# Patient Record
Sex: FEMALE | Employment: FULL TIME | URBAN - METROPOLITAN AREA
[De-identification: names, ages, dates, MRNs, and addresses within clinical notes are randomized per-mention and may not be internally consistent; named-entity substitution may affect disease eponyms.]

---

## 2017-02-07 ENCOUNTER — OFFICE VISIT (OUTPATIENT)
Dept: OBGYN CLINIC | Facility: CLINIC | Age: 47
End: 2017-02-07

## 2017-02-07 VITALS
WEIGHT: 128 LBS | HEIGHT: 66 IN | DIASTOLIC BLOOD PRESSURE: 99 MMHG | SYSTOLIC BLOOD PRESSURE: 170 MMHG | BODY MASS INDEX: 20.57 KG/M2

## 2017-02-07 DIAGNOSIS — Z12.31 VISIT FOR SCREENING MAMMOGRAM: ICD-10-CM

## 2017-02-07 DIAGNOSIS — Z01.419 WELL WOMAN EXAM WITH ROUTINE GYNECOLOGICAL EXAM: Primary | ICD-10-CM

## 2017-02-07 PROCEDURE — 99396 PREV VISIT EST AGE 40-64: CPT | Performed by: OBSTETRICS & GYNECOLOGY

## 2017-02-07 NOTE — PROGRESS NOTES
HPI:    Patient ID: Rajni Stephens is a 51year old female. HPI  Well woman exam  Menses mostly regular with a couple of irregular months including January. Felt a pea sized lump rt breast which she no longer feels. No mass on exam today.   Normal xiomy Smoking Status: Never Smoker                      Alcohol Use: Yes           0.0 oz/week       0 Standard drinks or equivalent per week       Comment: 1 glass, occasionally       PHYSICAL EXAM:    Physical Exam   Constitutional: She appears well-developed gynecological exam  (primary encounter diagnosis)  Elevated bp. History of \"borderline\" htn. Not on meds. Irregular menses occas. Transient v small rt breast lump. Gone now.   Monthly sbe, screening mammo in April  Normal exam.  Pap/HPV co-test q 3 y

## 2017-04-20 ENCOUNTER — HOSPITAL ENCOUNTER (OUTPATIENT)
Dept: MAMMOGRAPHY | Facility: HOSPITAL | Age: 47
Discharge: HOME | End: 2017-04-20
Attending: OBSTETRICS & GYNECOLOGY
Payer: COMMERCIAL

## 2017-04-20 DIAGNOSIS — Z12.31 VISIT FOR SCREENING MAMMOGRAM: ICD-10-CM

## 2017-04-20 PROCEDURE — 77067 SCR MAMMO BI INCL CAD: CPT

## 2017-06-30 ENCOUNTER — HOSPITAL ENCOUNTER (OUTPATIENT)
Age: 47
Discharge: HOME | End: 2017-06-30
Attending: EMERGENCY MEDICINE
Payer: COMMERCIAL

## 2017-06-30 VITALS
BODY MASS INDEX: 20.89 KG/M2 | HEART RATE: 77 BPM | OXYGEN SATURATION: 99 % | HEIGHT: 66 IN | TEMPERATURE: 99 F | WEIGHT: 130 LBS | RESPIRATION RATE: 16 BRPM

## 2017-06-30 DIAGNOSIS — H61.22 IMPACTED CERUMEN OF LEFT EAR: Primary | ICD-10-CM

## 2017-06-30 PROCEDURE — 99202 OFFICE O/P NEW SF 15 MIN: CPT

## 2017-06-30 PROCEDURE — 99213 OFFICE O/P EST LOW 20 MIN: CPT

## 2017-06-30 RX ORDER — LISINOPRIL 20 MG/1
20 TABLET ORAL DAILY
COMMUNITY
End: 2018-04-23

## 2017-06-30 NOTE — ED PROVIDER NOTES
Patient Seen in: [facility] Immediate Care In [address] [place] [address]    History   Patient presents with:  Ear Problem Pain (neurosensory)    Stated Complaint: ear problem    HPI    11-year-old female with history of hypertension presents with complaints of clog Yes           0.0 oz/week     Comment: 1 glass, occasionally      Review of Systems    Positive for stated complaint: ear problem  Other systems are as noted in HPI. Constitutional and vital signs reviewed.       All other systems reviewed and negative exc taking these medications    carbamide peroxide (DEBROX) 6.5 % Otic Solution  Place 5 drops into the left ear 2 (two) times daily.   Qty: 1 Bottle Refills: 0

## 2017-09-15 ENCOUNTER — OFFICE VISIT (OUTPATIENT)
Dept: OBGYN CLINIC | Facility: CLINIC | Age: 47
End: 2017-09-15

## 2017-09-15 VITALS
DIASTOLIC BLOOD PRESSURE: 86 MMHG | BODY MASS INDEX: 20 KG/M2 | WEIGHT: 124.63 LBS | SYSTOLIC BLOOD PRESSURE: 146 MMHG | HEART RATE: 82 BPM

## 2017-09-15 DIAGNOSIS — N63.10 LUMP OF RIGHT BREAST: Primary | ICD-10-CM

## 2017-09-15 PROCEDURE — 99213 OFFICE O/P EST LOW 20 MIN: CPT | Performed by: OBSTETRICS & GYNECOLOGY

## 2017-09-15 NOTE — PROGRESS NOTES
HPI:    Patient ID: Iona Lin is a 49year old female. HPI  Gyne problem. Complains of noticing a breast lump in her right breast this morning. Denies pain or tenderness or trauma. Had a normal mammogram in April. Category B breast density.   Angela Henriquezs

## 2017-09-19 ENCOUNTER — TELEPHONE (OUTPATIENT)
Dept: PEDIATRICS CLINIC | Facility: CLINIC | Age: 47
End: 2017-09-19

## 2017-09-19 ENCOUNTER — NURSE NAVIGATOR ENCOUNTER (OUTPATIENT)
Dept: HEMATOLOGY/ONCOLOGY | Facility: HOSPITAL | Age: 47
End: 2017-09-19

## 2017-09-19 ENCOUNTER — HOSPITAL ENCOUNTER (OUTPATIENT)
Dept: ULTRASOUND IMAGING | Facility: HOSPITAL | Age: 47
Discharge: HOME | End: 2017-09-19
Attending: OBSTETRICS & GYNECOLOGY
Payer: COMMERCIAL

## 2017-09-19 ENCOUNTER — HOSPITAL ENCOUNTER (OUTPATIENT)
Dept: MAMMOGRAPHY | Facility: HOSPITAL | Age: 47
Discharge: HOME | End: 2017-09-19
Attending: OBSTETRICS & GYNECOLOGY
Payer: COMMERCIAL

## 2017-09-19 ENCOUNTER — TELEPHONE (OUTPATIENT)
Dept: OBGYN CLINIC | Facility: CLINIC | Age: 47
End: 2017-09-19

## 2017-09-19 DIAGNOSIS — N63.10 LUMP OF RIGHT BREAST: ICD-10-CM

## 2017-09-19 PROCEDURE — 76642 ULTRASOUND BREAST LIMITED: CPT | Performed by: OBSTETRICS & GYNECOLOGY

## 2017-09-19 PROCEDURE — 77066 DX MAMMO INCL CAD BI: CPT | Performed by: OBSTETRICS & GYNECOLOGY

## 2017-09-19 NOTE — PROGRESS NOTES
Patient presents today for Diagnostic imaging of her right breast.  Breast RN Navigator to meet with patient and her spouse Gregor Lanes to discuss further recommendations from Dr. Juan to proceed with a right breast US guided biopsy.     Patient reports finding Educated the patient they will be awake during this procedure and are able to drive themselves home if they wish. Educated patient that some soreness may occur after biopsy.   Discussed use of a supportive bra and ice packs after procedure, to decrease

## 2017-09-19 NOTE — TELEPHONE ENCOUNTER
Called patient to review results of mammogram and ultrasound. Needs ultrasound-guided right breast biopsy for evaluation of mass. Orders to be signed off and sent.

## 2017-09-20 ENCOUNTER — NURSE NAVIGATOR ENCOUNTER (OUTPATIENT)
Dept: HEMATOLOGY/ONCOLOGY | Facility: HOSPITAL | Age: 47
End: 2017-09-20

## 2017-09-20 NOTE — PROGRESS NOTES
Navigator received phone call from patient inquiring about biopsy scheduling. Navigator called back, discussed ordering/scheduling process. Pt aware she will be receiving a call from Radiology scheduling once biopsy order has been obtained.

## 2017-09-21 ENCOUNTER — TELEPHONE (OUTPATIENT)
Dept: OBGYN CLINIC | Facility: CLINIC | Age: 47
End: 2017-09-21

## 2017-09-21 NOTE — PROGRESS NOTES
Navigator received voicemail from patient requesting assistance scheduling her breast biopsy. Navigator called patient back, confirmed medications, and transferred patient to Radiology scheduling to make appointment.

## 2017-09-22 ENCOUNTER — HOSPITAL ENCOUNTER (OUTPATIENT)
Dept: ULTRASOUND IMAGING | Facility: HOSPITAL | Age: 47
Discharge: HOME | End: 2017-09-22
Attending: OBSTETRICS & GYNECOLOGY
Payer: COMMERCIAL

## 2017-09-22 ENCOUNTER — HOSPITAL ENCOUNTER (OUTPATIENT)
Dept: MAMMOGRAPHY | Facility: HOSPITAL | Age: 47
Discharge: HOME | End: 2017-09-22
Attending: OBSTETRICS & GYNECOLOGY
Payer: COMMERCIAL

## 2017-09-22 VITALS — HEART RATE: 86 BPM | SYSTOLIC BLOOD PRESSURE: 135 MMHG | DIASTOLIC BLOOD PRESSURE: 90 MMHG | RESPIRATION RATE: 16 BRPM

## 2017-09-22 DIAGNOSIS — N63.0 BREAST MASS: ICD-10-CM

## 2017-09-22 DIAGNOSIS — Z17.1 MALIGNANT NEOPLASM OF LOWER-OUTER QUADRANT OF RIGHT BREAST OF FEMALE, ESTROGEN RECEPTOR NEGATIVE (HCC): Primary | ICD-10-CM

## 2017-09-22 DIAGNOSIS — C50.511 MALIGNANT NEOPLASM OF LOWER-OUTER QUADRANT OF RIGHT BREAST OF FEMALE, ESTROGEN RECEPTOR NEGATIVE (HCC): Primary | ICD-10-CM

## 2017-09-22 PROCEDURE — 88342 IMHCHEM/IMCYTCHM 1ST ANTB: CPT | Performed by: OBSTETRICS & GYNECOLOGY

## 2017-09-22 PROCEDURE — 88305 TISSUE EXAM BY PATHOLOGIST: CPT | Performed by: OBSTETRICS & GYNECOLOGY

## 2017-09-22 PROCEDURE — 10022 US BREAST BIOPSY WITH CLIP 1 SITE RIGHT: CPT | Performed by: OBSTETRICS & GYNECOLOGY

## 2017-09-22 PROCEDURE — 88172 CYTP DX EVAL FNA 1ST EA SITE: CPT | Performed by: OBSTETRICS & GYNECOLOGY

## 2017-09-22 PROCEDURE — 88360 TUMOR IMMUNOHISTOCHEM/MANUAL: CPT | Performed by: OBSTETRICS & GYNECOLOGY

## 2017-09-22 PROCEDURE — 88173 CYTOPATH EVAL FNA REPORT: CPT | Performed by: OBSTETRICS & GYNECOLOGY

## 2017-09-22 PROCEDURE — 77065 DX MAMMO INCL CAD UNI: CPT | Performed by: OBSTETRICS & GYNECOLOGY

## 2017-09-22 PROCEDURE — 88177 CYTP FNA EVAL EA ADDL: CPT | Performed by: OBSTETRICS & GYNECOLOGY

## 2017-09-22 PROCEDURE — 19083 BX BREAST 1ST LESION US IMAG: CPT | Performed by: OBSTETRICS & GYNECOLOGY

## 2017-09-22 RX ORDER — LIDOCAINE HYDROCHLORIDE 10 MG/ML
INJECTION, SOLUTION EPIDURAL; INFILTRATION; INTRACAUDAL; PERINEURAL
Status: DISPENSED
Start: 2017-09-22 | End: 2017-09-22

## 2017-09-22 NOTE — PROCEDURES
[facility]  Procedure Note    Alex Rodriguez Patient Status:  Outpatient    1970 MRN [id number]   Location [address] [facility] Attending Sarah Khan MD   Hosp Day # 0 PCP Diego Hawkins MD     Procedure: ultra

## 2017-09-22 NOTE — IMAGING NOTE
PT ARRIVED TO ROOM 3   SCANS BY [address]    HX TAKEN PROCEDURE EXPLAINED QUESTIONS ANSWERED  Right breast lump, new palpable mass    PT CONSENTED  [facility]  DR SHARMA [facility]    TIMEOUT TAKEN AT [address]

## 2017-09-25 ENCOUNTER — NURSE NAVIGATOR ENCOUNTER (OUTPATIENT)
Dept: HEMATOLOGY/ONCOLOGY | Facility: HOSPITAL | Age: 47
End: 2017-09-25

## 2017-09-25 ENCOUNTER — OFFICE VISIT (OUTPATIENT)
Dept: OBGYN CLINIC | Facility: CLINIC | Age: 47
End: 2017-09-25

## 2017-09-25 ENCOUNTER — TELEPHONE (OUTPATIENT)
Dept: PEDIATRICS CLINIC | Facility: CLINIC | Age: 47
End: 2017-09-25

## 2017-09-25 DIAGNOSIS — C50.411 MALIGNANT NEOPLASM OF UPPER-OUTER QUADRANT OF RIGHT FEMALE BREAST, UNSPECIFIED ESTROGEN RECEPTOR STATUS (HCC): Primary | ICD-10-CM

## 2017-09-25 PROBLEM — C50.511 BREAST CANCER OF LOWER-OUTER QUADRANT OF RIGHT FEMALE BREAST (HCC): Status: ACTIVE | Noted: 2017-09-25

## 2017-09-25 PROCEDURE — 99213 OFFICE O/P EST LOW 20 MIN: CPT | Performed by: OBSTETRICS & GYNECOLOGY

## 2017-09-25 NOTE — PROGRESS NOTES
Phoned patient and reintroduced myself as Breast RN Navigator. Patient has met with Dr. Suzi Ghotra this morning to discuss results from her right breast biopsy. Emotional support provided to patient.   Dr. Suzi Ghotra provided patient with appointment information to

## 2017-09-25 NOTE — PROGRESS NOTES
HPI:    Patient ID: Sarah Interiano is a 49year old female. HPI  Consultation  Asked patient to come in to discuss results of her breast bx. Here with her  Grecia Foley. Informed of bx positive for infiltrating ductal CA, gr 3.   Focal lymphovascular

## 2017-09-26 ENCOUNTER — HOSPITAL ENCOUNTER (OUTPATIENT)
Dept: CT IMAGING | Facility: HOSPITAL | Age: 47
Discharge: HOME | End: 2017-09-26
Attending: INTERNAL MEDICINE
Payer: COMMERCIAL

## 2017-09-26 ENCOUNTER — HOSPITAL ENCOUNTER (OUTPATIENT)
Dept: GENERAL RADIOLOGY | Facility: HOSPITAL | Age: 47
Discharge: HOME | End: 2017-09-26
Attending: INTERNAL MEDICINE
Payer: COMMERCIAL

## 2017-09-26 ENCOUNTER — OFFICE VISIT (OUTPATIENT)
Dept: HEMATOLOGY/ONCOLOGY | Facility: HOSPITAL | Age: 47
End: 2017-09-26
Attending: INTERNAL MEDICINE
Payer: COMMERCIAL

## 2017-09-26 ENCOUNTER — LAB ENCOUNTER (OUTPATIENT)
Dept: LAB | Facility: HOSPITAL | Age: 47
End: 2017-09-26
Attending: INTERNAL MEDICINE
Payer: COMMERCIAL

## 2017-09-26 ENCOUNTER — NURSE NAVIGATOR ENCOUNTER (OUTPATIENT)
Dept: HEMATOLOGY/ONCOLOGY | Facility: HOSPITAL | Age: 47
End: 2017-09-26

## 2017-09-26 ENCOUNTER — TELEPHONE (OUTPATIENT)
Dept: PEDIATRICS CLINIC | Facility: CLINIC | Age: 47
End: 2017-09-26

## 2017-09-26 ENCOUNTER — TELEPHONE (OUTPATIENT)
Dept: HEMATOLOGY/ONCOLOGY | Facility: HOSPITAL | Age: 47
End: 2017-09-26

## 2017-09-26 VITALS
DIASTOLIC BLOOD PRESSURE: 77 MMHG | TEMPERATURE: 99 F | HEIGHT: 66 IN | RESPIRATION RATE: 16 BRPM | SYSTOLIC BLOOD PRESSURE: 159 MMHG | WEIGHT: 122.38 LBS | HEART RATE: 84 BPM | BODY MASS INDEX: 19.67 KG/M2

## 2017-09-26 DIAGNOSIS — C50.511 BREAST CANCER OF LOWER-OUTER QUADRANT OF RIGHT FEMALE BREAST (HCC): Primary | ICD-10-CM

## 2017-09-26 DIAGNOSIS — R05.9 COUGHING: ICD-10-CM

## 2017-09-26 DIAGNOSIS — R05.9 COUGH IN ADULT: ICD-10-CM

## 2017-09-26 DIAGNOSIS — C50.912 NEOPLASM OF LEFT BREAST, REGIONAL LYMPH NODE STAGING CATEGORY PN0 PER AMERICAN JOINT COMMITTEE ON CANCER STAGING GUIDELINES, 7TH EDITION (HCC): Primary | ICD-10-CM

## 2017-09-26 DIAGNOSIS — Z79.899 ENCOUNTER FOR MONITORING CARDIOTOXIC DRUG THERAPY: ICD-10-CM

## 2017-09-26 DIAGNOSIS — C50.511 BREAST CANCER OF LOWER-OUTER QUADRANT OF RIGHT FEMALE BREAST (HCC): ICD-10-CM

## 2017-09-26 DIAGNOSIS — C50.511 MALIGNANT NEOPLASM OF LOWER-OUTER QUADRANT OF RIGHT BREAST OF FEMALE, ESTROGEN RECEPTOR NEGATIVE (HCC): ICD-10-CM

## 2017-09-26 DIAGNOSIS — Z17.1 MALIGNANT NEOPLASM OF LOWER-OUTER QUADRANT OF RIGHT BREAST OF FEMALE, ESTROGEN RECEPTOR NEGATIVE (HCC): ICD-10-CM

## 2017-09-26 DIAGNOSIS — I10 ESSENTIAL HYPERTENSION: ICD-10-CM

## 2017-09-26 DIAGNOSIS — R92.2 DENSE BREASTS: ICD-10-CM

## 2017-09-26 DIAGNOSIS — Z51.81 ENCOUNTER FOR MONITORING CARDIOTOXIC DRUG THERAPY: ICD-10-CM

## 2017-09-26 LAB
ALBUMIN SERPL BCP-MCNC: 4.3 G/DL (ref 3.5–4.8)
ALBUMIN/GLOB SERPL: 1.7 {RATIO} (ref 1–2)
ALP SERPL-CCNC: 51 U/L (ref 32–100)
ALT SERPL-CCNC: 15 U/L (ref 14–54)
ANION GAP SERPL CALC-SCNC: 6 MMOL/L (ref 0–18)
AST SERPL-CCNC: 23 U/L (ref 15–41)
BASOPHILS # BLD: 0 K/UL (ref 0–0.2)
BASOPHILS NFR BLD: 1 %
BILIRUB SERPL-MCNC: 0.7 MG/DL (ref 0.3–1.2)
BUN SERPL-MCNC: 7 MG/DL (ref 8–20)
BUN/CREAT SERPL: 11.9 (ref 10–20)
CALCIUM SERPL-MCNC: 8.9 MG/DL (ref 8.5–10.5)
CHLORIDE SERPL-SCNC: 103 MMOL/L (ref 95–110)
CO2 SERPL-SCNC: 29 MMOL/L (ref 22–32)
CREAT BLD-MCNC: 0.6 MG/DL (ref 0.5–1.5)
CREAT SERPL-MCNC: 0.59 MG/DL (ref 0.5–1.5)
EOSINOPHIL # BLD: 0 K/UL (ref 0–0.7)
EOSINOPHIL NFR BLD: 1 %
ERYTHROCYTE [DISTWIDTH] IN BLOOD BY AUTOMATED COUNT: 13 % (ref 11–15)
GLOBULIN PLAS-MCNC: 2.5 G/DL (ref 2.5–3.7)
GLUCOSE SERPL-MCNC: 83 MG/DL (ref 70–99)
HCT VFR BLD AUTO: 41.4 % (ref 35–48)
HGB BLD-MCNC: 14.1 G/DL (ref 12–16)
LYMPHOCYTES # BLD: 1.5 K/UL (ref 1–4)
LYMPHOCYTES NFR BLD: 19 %
MCH RBC QN AUTO: 30.9 PG (ref 27–32)
MCHC RBC AUTO-ENTMCNC: 34 G/DL (ref 32–37)
MCV RBC AUTO: 90.9 FL (ref 80–100)
MONOCYTES # BLD: 0.4 K/UL (ref 0–1)
MONOCYTES NFR BLD: 5 %
NEUTROPHILS # BLD AUTO: 6.1 K/UL (ref 1.8–7.7)
NEUTROPHILS NFR BLD: 75 %
OSMOLALITY UR CALC.SUM OF ELEC: 283 MOSM/KG (ref 275–295)
PLATELET # BLD AUTO: 262 K/UL (ref 140–400)
PMV BLD AUTO: 10 FL (ref 7.4–10.3)
POTASSIUM SERPL-SCNC: 3.5 MMOL/L (ref 3.3–5.1)
PROT SERPL-MCNC: 6.8 G/DL (ref 5.9–8.4)
RBC # BLD AUTO: 4.55 M/UL (ref 3.7–5.4)
SODIUM SERPL-SCNC: 138 MMOL/L (ref 136–144)
WBC # BLD AUTO: 8.2 K/UL (ref 4–11)

## 2017-09-26 PROCEDURE — 93010 ELECTROCARDIOGRAM REPORT: CPT | Performed by: INTERNAL MEDICINE

## 2017-09-26 PROCEDURE — 36415 COLL VENOUS BLD VENIPUNCTURE: CPT

## 2017-09-26 PROCEDURE — 71260 CT THORAX DX C+: CPT | Performed by: INTERNAL MEDICINE

## 2017-09-26 PROCEDURE — 93005 ELECTROCARDIOGRAM TRACING: CPT

## 2017-09-26 PROCEDURE — 85025 COMPLETE CBC W/AUTO DIFF WBC: CPT

## 2017-09-26 PROCEDURE — 99205 OFFICE O/P NEW HI 60 MIN: CPT | Performed by: INTERNAL MEDICINE

## 2017-09-26 PROCEDURE — 80053 COMPREHEN METABOLIC PANEL: CPT

## 2017-09-26 PROCEDURE — 82565 ASSAY OF CREATININE: CPT

## 2017-09-26 PROCEDURE — 74177 CT ABD & PELVIS W/CONTRAST: CPT | Performed by: INTERNAL MEDICINE

## 2017-09-26 PROCEDURE — 71020 XR CHEST PA + LAT CHEST (CPT=71020): CPT | Performed by: INTERNAL MEDICINE

## 2017-09-26 PROCEDURE — 99212 OFFICE O/P EST SF 10 MIN: CPT | Performed by: INTERNAL MEDICINE

## 2017-09-26 NOTE — TELEPHONE ENCOUNTER
Pt states she needs dr notes regarding breast cancer faxed to [facility] today 9/26    [facility]#[id number] attn:[email or web address]

## 2017-09-26 NOTE — TELEPHONE ENCOUNTER
Patient phoned with questions prior to her visit with Dr. Karolina Blake today. Patient shares that her spouse had spoken with a family friend, who has encouraged patient to inquire as to running additional tests on her biopsy tissue.   In particular patient wishe

## 2017-09-26 NOTE — PROGRESS NOTES
Pt scored 8 on distress screening- worries about treatment, , transportation, worry etc. I gave her the support system sheet and introduced our social wokers role, she just got diagnosis and is probably  seeking a second opinion elsewhere.  Pt sta

## 2017-09-27 ENCOUNTER — TELEPHONE (OUTPATIENT)
Dept: HEMATOLOGY/ONCOLOGY | Facility: HOSPITAL | Age: 47
End: 2017-09-27

## 2017-09-27 ENCOUNTER — OFFICE VISIT (OUTPATIENT)
Dept: SURGERY | Facility: CLINIC | Age: 47
End: 2017-09-27

## 2017-09-27 ENCOUNTER — GENETICS ENCOUNTER (OUTPATIENT)
Dept: GENETICS | Facility: HOSPITAL | Age: 47
End: 2017-09-27

## 2017-09-27 ENCOUNTER — APPOINTMENT (OUTPATIENT)
Dept: GENETICS | Facility: HOSPITAL | Age: 47
End: 2017-09-27
Attending: GENETIC COUNSELOR, MS
Payer: COMMERCIAL

## 2017-09-27 VITALS
RESPIRATION RATE: 18 BRPM | TEMPERATURE: 98 F | SYSTOLIC BLOOD PRESSURE: 140 MMHG | DIASTOLIC BLOOD PRESSURE: 89 MMHG | OXYGEN SATURATION: 100 % | HEART RATE: 69 BPM

## 2017-09-27 DIAGNOSIS — Z17.1 MALIGNANT NEOPLASM OF LOWER-OUTER QUADRANT OF RIGHT BREAST OF FEMALE, ESTROGEN RECEPTOR NEGATIVE (HCC): Primary | ICD-10-CM

## 2017-09-27 DIAGNOSIS — C50.511 MALIGNANT NEOPLASM OF LOWER-OUTER QUADRANT OF RIGHT BREAST OF FEMALE, ESTROGEN RECEPTOR NEGATIVE (HCC): Primary | ICD-10-CM

## 2017-09-27 DIAGNOSIS — F41.9 ANXIETY: ICD-10-CM

## 2017-09-27 PROBLEM — R92.30 DENSE BREASTS: Status: ACTIVE | Noted: 2017-09-27

## 2017-09-27 PROBLEM — R92.2 DENSE BREASTS: Status: ACTIVE | Noted: 2017-09-27

## 2017-09-27 PROBLEM — R05.9 COUGH IN ADULT: Status: ACTIVE | Noted: 2017-09-27

## 2017-09-27 PROBLEM — C50.912 NEOPLASM OF LEFT BREAST, REGIONAL LYMPH NODE STAGING CATEGORY PN0 PER AMERICAN JOINT COMMITTEE ON CANCER STAGING GUIDELINES, 7TH EDITION (HCC): Status: ACTIVE | Noted: 2017-09-27

## 2017-09-27 PROBLEM — I10 HYPERTENSION: Status: ACTIVE | Noted: 2017-09-27

## 2017-09-27 PROCEDURE — 96040 MEDICAL GENETICS COUNSELING EA 30 MIN: CPT | Performed by: GENETIC COUNSELOR, MS

## 2017-09-27 PROCEDURE — 99245 OFF/OP CONSLTJ NEW/EST HI 55: CPT | Performed by: SURGERY

## 2017-09-27 PROCEDURE — 36415 COLL VENOUS BLD VENIPUNCTURE: CPT

## 2017-09-27 RX ORDER — DIAZEPAM 5 MG/1
5 TABLET ORAL 3 TIMES DAILY PRN
Qty: 10 TABLET | Refills: 0 | Status: SHIPPED | OUTPATIENT
Start: 2017-09-27 | End: 2018-06-28

## 2017-09-27 NOTE — PROGRESS NOTES
NISHA Chatman is a 49year old female who self palpated a mass in the outer lower quadrant of the left breast, saw Dr. Philip Alegria, had a diagnostic mammogram 9/19/17, and a core biopsy of the left breast 9/22/17 with FNA of left axillary node.  Charles 2002-Pregnancy blood clot in placenta bed rest [id number], ; -Pregnancy bed rest [id number]  labor symptoms,  section TWINS; - section with TUBAL   • Hypertension    • Myopia with astigmatism     OU   • Ophthalmological Pulmonary/Chest: Effort normal and breath sounds normal. No respiratory distress. She has no wheezes. She has no rales. Breasts are asymmetrical.       Abdominal: Bowel sounds are normal. She exhibits no distension. There is no tenderness.    Dany Schirmer Bilirubin, Total 0.7 0.3 - 1.2 mg/dL   Total Protein 6.8 5.9 - 8.4 g/dL   Albumin 4.3 3.5 - 4.8 g/dL   Globulin 2.5 2.5 - 3.7 g/dL   A/G Ratio 1.7 1.0 - 2.0   Anion Gap 6 0 - 18 mmol/L   BUN/CREA Ratio 11.9 10.0 - 20.0   Calculated Osmolality 283 834 - 295 The concurrent fine needle aspiration biopsy of the right breast lesion ([id number]), is also reviewed, the cytologic and histologic findings correlate.   The concurrent fine-needle aspiration biopsy of the right axillary lymph node ([id number]), and the ce The tissue that has been submitted for tumor markers by manual quantitative and semi quantitative analysis was fixed in 10% neutral buffered formalin, following the recommended CAP guidelines time fixation (6-72 hours ).      All antibodies are validated to Component Discrete Value Range Status Case Report Non-Gynecologic Cytology                          Case: [id number]                                 Authorizing Provider:  Alban Liu MD       Collected:           09/22/2017 11:27 AM         Ordering For  purposes, this case was reviewed by a second pathologist who concurred with the diagnosis. Dr. Bridgette Bermudez  has been notified with the above findings on 9- at 10.30 a.m.      PROCEDURE:  SHADE BIOPSY POST DIGITAL IMAGE RIGHT (CPT=7706 COMPARISON: [facility], [facility] PF DIGITAL SCREEN W CAD, 3/28/2015, 8:50. [facility] DIGITAL SCREEN W CAD PF, 4/13/2016, 11:31.   [place], [place] DIAGNOSTIC BILATERAL ILF=[id number]), department nurse, who also provided the patient with post biopsy aftercare instructions. Prior to discharge the patient did go to mammography. The clip was within the expected mammographic finding. She was discharged in satisfactory condition.  She was   in For  purposes, this case was reviewed by a second pathologist who concurred with the diagnosis. Dr. Rubén Montoya has been notified with the above findings on [id number] 10.30 a.m.      Tumor Markers by Immunostaining (ultraView, Biotin free, Uni DESCRIPTION: The patient was informed of the nature of the procedure which included a discussion of the benefits and risks including, but not limited to, hemorrhage and infection. Informed consent was obtained.   An ultrasound guided percutaneous biopsy MEDICATION:   1% lidocaine administered locally. COMPLICATIONS:         None. PATHOLOGY:   Pending.        Dictated by (CST): Karyn Bean MD on 9/22/2017 at 11:54       Approved by (CST): Karyn Bean MD on 9/22/2017 at 15:19          === microcalcifications. No additional mass, cyst or sonographically suspicious abnormality. Ultrasound evaluation of   the right axilla show a few mildly prominent but otherwise unremarkable/normal-appearing lymph nodes.      Dictated by (CST): Reyna España

## 2017-09-27 NOTE — PROGRESS NOTES
Patient presents to the [facility], accompanied by her spouse Ronny Garner, for consultation with Dr. Kiko Whittaker. Patient recently diagnosed with right breast infiltrating ductal carcinoma. Patient is know to me previously as Breast RN Navigator.   Becky Yousif be sent for Oncotype DX for genomic confirmation of ER/FL. Dr. Ede Lutz is also recommending genetic counseling for consideration of testing. Coordinated with [facility] staff in the [facility]. Insurance authorization obtained for CT scan.   Radiology cont

## 2017-09-28 NOTE — PROGRESS NOTES
Reason for visit: Ms. Franklin Rudolph is a 48-year-old woman referred to genetic counseling due to her recent diagnosis of triple negative breast cancer.   She was seen today to discuss the option of genetic testing and how this may help with her management a believe that one of her paternal great-grandmothers was diagnosed with ovarian cancer, but otherwise she is unaware of malignancies on this side of the family.   Her heritage is English/Serbian on her maternal side of her family and of [facility] on he relatives, and psychological/emotional well-being. Mutations in the genes BRCA1 and BRCA2 account for most cases (but not all) of hereditary breast cancer.   Mutations in other genes have also been associated with an increased risk for breast cancer - but Turn-around-time is typically 7-12 calendar days from receipt of the sample. My office will call MsNola Vielka Liliya as soon as results are received; post-test counseling can be scheduled at that time.   If she desires additional genes to be tested following the c

## 2017-09-29 ENCOUNTER — TELEPHONE (OUTPATIENT)
Dept: HEMATOLOGY/ONCOLOGY | Facility: HOSPITAL | Age: 47
End: 2017-09-29

## 2017-09-29 NOTE — TELEPHONE ENCOUNTER
Received email communication from patient as follows:    Sunrise,    I'm so sorry for the lateness of this. Can you cancel my tests [id number]. Echo and MRI. I need to push them off until Ludmila finished review.     Thank you,  Karolina Robles cancelled by

## 2017-10-04 ENCOUNTER — TELEPHONE (OUTPATIENT)
Dept: GENETICS | Facility: HOSPITAL | Age: 47
End: 2017-10-04

## 2017-10-05 ENCOUNTER — TELEPHONE (OUTPATIENT)
Dept: GENETICS | Facility: HOSPITAL | Age: 47
End: 2017-10-05

## 2017-10-05 NOTE — TELEPHONE ENCOUNTER
Thaddeus Hurst returned my call from yesterday to review her genetic testing results. A 9 gene breast-focused panel was pursued ([facility] Breast Cancer STAT panel with EVAN and CHEK2) and yielded negative results aside from 2 VUS.  One VUS in EVAN (c.670A>G) and one VU

## 2017-10-09 ENCOUNTER — TELEPHONE (OUTPATIENT)
Dept: HEMATOLOGY/ONCOLOGY | Facility: HOSPITAL | Age: 47
End: 2017-10-09

## 2017-12-01 ENCOUNTER — TELEPHONE (OUTPATIENT)
Dept: OBGYN CLINIC | Facility: CLINIC | Age: 47
End: 2017-12-01

## 2017-12-01 NOTE — TELEPHONE ENCOUNTER
Pt would like to speak to [facility] about a vaginal diagnosis from an exam she had a few years ago.  Pt does have breast cancer and seeing an oncologist, pt wanted to bring it up to her oncologist but would like to speak to [facility]

## 2017-12-01 NOTE — TELEPHONE ENCOUNTER
Pt reporting some time ago it was found she had a bump or some growth on her \"skin fold\" or labia (pt unsure of location name) she recalls being told it wasn't anything concerning and was there for many years.   Per pt after about 4-5 rounds of chemothera

## 2018-01-02 NOTE — PROGRESS NOTES
Breast Surgery New Patient Consultation    This is the first visit for this 49year old woman, referred by Dr. Jose Jackson, who presents for evaluation of breast cancer.     History of Present Illness:   Ms. Pricila Barcenas is a 49year old woman who presents wit breastfeeding history of 5 months, last unknown time ago. She achieved menarche at age 14 and LMP  LMP: 08/16/17  She has history of oral contraceptive use for 4 years, last in 1999. She has recieved infertility treatment to achieve pregnancy.     Nathan Chen flat, SOB/Coughing at night, swelling of the legs or chest pain while walking.     Breasts:  See history of present illness    Gastrointestinal:     There is no history of difficulty or pain with swallowing, reflux symptoms, vomiting, dark or bloody stools, speech patterns and movements are normal. Her affect is appropriate. HEENT: The head is normocephalic. The neck is supple. The thyroid is not enlarged and is without palpable masses/nodules. There are no palpable masses. The trachea is in the midline.  C natural history and evolution of breast cancer were discussed with Ms. Akosua Don and her family, including the difference between in-situ and invasive carcinoma, and the distinction between local and systemic disease and local and systemic therapy.  Salvadore Skiff has been  encouraged to contact the office with any questions or concerns prior to her next appointment.

## 2018-01-10 ENCOUNTER — TELEPHONE (OUTPATIENT)
Dept: GENETICS | Facility: HOSPITAL | Age: 48
End: 2018-01-10

## 2018-01-10 NOTE — TELEPHONE ENCOUNTER
Thaddeus Hurst and I had communicated over the holidays in regards to re-requisitioning for additional genes and she had elected to pursue 10 additional genes through [facility] ([facility] Breast and Gyn Cancers Guidelines-Based Panel).   Results are now available and do

## 2018-04-23 ENCOUNTER — OFFICE VISIT (OUTPATIENT)
Dept: OBGYN CLINIC | Facility: CLINIC | Age: 48
End: 2018-04-23

## 2018-04-23 VITALS
WEIGHT: 109 LBS | DIASTOLIC BLOOD PRESSURE: 81 MMHG | HEART RATE: 96 BPM | SYSTOLIC BLOOD PRESSURE: 121 MMHG | BODY MASS INDEX: 18 KG/M2

## 2018-04-23 DIAGNOSIS — L72.3 SEBACEOUS CYST: Primary | ICD-10-CM

## 2018-04-23 PROCEDURE — 99212 OFFICE O/P EST SF 10 MIN: CPT | Performed by: OBSTETRICS & GYNECOLOGY

## 2018-04-23 RX ORDER — LOSARTAN POTASSIUM 50 MG/1
50 TABLET ORAL
COMMUNITY

## 2018-04-23 RX ORDER — LEVOTHYROXINE SODIUM 0.07 MG/1
75 TABLET ORAL
COMMUNITY
Start: 2018-04-09 | End: 2018-06-15

## 2018-04-23 NOTE — PROGRESS NOTES
HPI:    Patient ID: Irina Hernandez is a 51year old female. HPI  Gyne problem  Completed course of chemo for breast CA. Has lost weight.  c/o noticing small skin lesion that came and went before. nontender and has not drained.   Review of Systems

## 2018-06-15 ENCOUNTER — OFFICE VISIT (OUTPATIENT)
Dept: OBGYN CLINIC | Facility: CLINIC | Age: 48
End: 2018-06-15

## 2018-06-15 VITALS — BODY MASS INDEX: 18 KG/M2 | SYSTOLIC BLOOD PRESSURE: 108 MMHG | WEIGHT: 111 LBS | DIASTOLIC BLOOD PRESSURE: 70 MMHG

## 2018-06-15 DIAGNOSIS — Z01.419 WELL WOMAN EXAM WITH ROUTINE GYNECOLOGICAL EXAM: Primary | ICD-10-CM

## 2018-06-15 PROCEDURE — 99396 PREV VISIT EST AGE 40-64: CPT | Performed by: OBSTETRICS & GYNECOLOGY

## 2018-06-15 RX ORDER — LEVOTHYROXINE SODIUM 0.12 MG/1
125 TABLET ORAL
COMMUNITY
Start: 2018-05-31 | End: 2019-06-15

## 2018-06-15 NOTE — PROGRESS NOTES
HPI:    Patient ID: Kimberly Fleming is a 51year old female. HPI  Well woman visit  Status post bilateral mastectomy for right breast cancer at [facility]. States path report negative for residual cancer. Completed course of chemotherapy.   Is f NODE BIOPSY  2001: TREAT ECTOPIC PREG,NON REMVAL      Comment: removal of ectopic pregnancy - tube intact   Family History   Problem Relation Age of Onset   • Diabetes Other      aunt   • Ovarian Cancer Other    • Cancer Father 72     prostate ca; sx masses, lumps, skin changes, erythema, or lesions. Axilla-  No adenopathy, mass, or tenderness. Genitourinary:   Pelvic exam was performed with patient supine and chaperone present.   External genitalia- normal.  Bartholin's and Clear[place]'s glands normal.

## 2018-06-28 ENCOUNTER — OFFICE VISIT (OUTPATIENT)
Dept: OBGYN CLINIC | Facility: CLINIC | Age: 48
End: 2018-06-28

## 2018-06-28 VITALS
DIASTOLIC BLOOD PRESSURE: 83 MMHG | SYSTOLIC BLOOD PRESSURE: 133 MMHG | BODY MASS INDEX: 18 KG/M2 | WEIGHT: 110 LBS | HEART RATE: 94 BPM

## 2018-06-28 DIAGNOSIS — L73.9 INFLAMED HAIR FOLLICLE: Primary | ICD-10-CM

## 2018-06-28 PROCEDURE — 99212 OFFICE O/P EST SF 10 MIN: CPT | Performed by: OBSTETRICS & GYNECOLOGY

## 2018-06-28 NOTE — PROGRESS NOTES
HPI:    Patient ID: Nabeel Corrales is a 51year old female. HPI  Gyne prob  Noted tiny vulvar ?cyst.  Not painful or sore.   Review of Systems         Current Outpatient Prescriptions:  Levothyroxine Sodium (SYNTHROID) 125 MCG Oral Tab Take 125 mcg by m

## 2018-07-13 ENCOUNTER — HOSPITAL ENCOUNTER (OUTPATIENT)
Dept: GENERAL RADIOLOGY | Facility: HOSPITAL | Age: 48
Discharge: HOME | End: 2018-07-13
Attending: INTERNAL MEDICINE
Payer: COMMERCIAL

## 2018-07-13 ENCOUNTER — TELEPHONE (OUTPATIENT)
Dept: OBGYN CLINIC | Facility: CLINIC | Age: 48
End: 2018-07-13

## 2018-07-13 DIAGNOSIS — M53.82 CHRONIC LIMITATION OF MOVEMENT OF NECK: ICD-10-CM

## 2018-07-13 PROCEDURE — 72050 X-RAY EXAM NECK SPINE 4/5VWS: CPT | Performed by: INTERNAL MEDICINE

## 2018-07-28 ENCOUNTER — HOSPITAL ENCOUNTER (OUTPATIENT)
Age: 48
Discharge: HOME | End: 2018-07-28
Attending: EMERGENCY MEDICINE
Payer: COMMERCIAL

## 2018-07-28 VITALS
RESPIRATION RATE: 16 BRPM | DIASTOLIC BLOOD PRESSURE: 75 MMHG | SYSTOLIC BLOOD PRESSURE: 134 MMHG | HEART RATE: 78 BPM | TEMPERATURE: 98 F

## 2018-07-28 DIAGNOSIS — H65.92 LEFT OTITIS MEDIA WITH EFFUSION: Primary | ICD-10-CM

## 2018-07-28 PROCEDURE — 99214 OFFICE O/P EST MOD 30 MIN: CPT

## 2018-07-28 PROCEDURE — 99213 OFFICE O/P EST LOW 20 MIN: CPT

## 2018-07-28 RX ORDER — AMOXICILLIN 875 MG/1
875 TABLET, COATED ORAL 2 TIMES DAILY
Qty: 14 TABLET | Refills: 0 | Status: SHIPPED | OUTPATIENT
Start: 2018-07-28 | End: 2018-08-04

## 2018-07-28 NOTE — ED PROVIDER NOTES
Patient Seen in: [facility] Immediate Care In [address] [place] [address]    History   Patient presents with:  Ear Problem Pain (neurosensory)    Stated Complaint: ears problems    HPI    The patient is a 43-year-old female with past history of breast cancer who pre oz/week     Comment: 1 glass, occasionally      Review of Systems    Positive for stated complaint: ears problems  Other systems are as noted in HPI. Constitutional and vital signs reviewed.       All other systems reviewed and negative except as noted abo tablet (875 mg total) by mouth 2 (two) times daily.   Qty: 14 tablet Refills: 0

## 2018-11-21 ENCOUNTER — HOSPITAL ENCOUNTER (OUTPATIENT)
Dept: GENERAL RADIOLOGY | Facility: HOSPITAL | Age: 48
Discharge: HOME | End: 2018-11-21
Attending: INTERNAL MEDICINE
Payer: COMMERCIAL

## 2018-11-21 DIAGNOSIS — M25.562 PATELLOFEMORAL ARTHRALGIA OF LEFT KNEE: ICD-10-CM

## 2018-11-21 PROCEDURE — 73560 X-RAY EXAM OF KNEE 1 OR 2: CPT | Performed by: INTERNAL MEDICINE

## 2018-12-18 ENCOUNTER — TELEPHONE (OUTPATIENT)
Dept: OBGYN CLINIC | Facility: CLINIC | Age: 48
End: 2018-12-18

## 2018-12-18 ENCOUNTER — OFFICE VISIT (OUTPATIENT)
Dept: OBGYN CLINIC | Facility: CLINIC | Age: 48
End: 2018-12-18
Payer: COMMERCIAL

## 2018-12-18 VITALS
SYSTOLIC BLOOD PRESSURE: 102 MMHG | DIASTOLIC BLOOD PRESSURE: 70 MMHG | BODY MASS INDEX: 18.54 KG/M2 | HEIGHT: 66 IN | WEIGHT: 115.38 LBS

## 2018-12-18 DIAGNOSIS — N90.89 VULVAR IRRITATION: Primary | ICD-10-CM

## 2018-12-18 PROCEDURE — 99213 OFFICE O/P EST LOW 20 MIN: CPT | Performed by: ADVANCED PRACTICE MIDWIFE

## 2018-12-18 RX ORDER — DESONIDE 0.5 MG/G
OINTMENT TOPICAL
Qty: 1 TUBE | Refills: 1 | Status: SHIPPED | OUTPATIENT
Start: 2018-12-18 | End: 2019-01-08

## 2018-12-18 RX ORDER — FLUCONAZOLE 150 MG/1
150 TABLET ORAL DAILY
Qty: 7 TABLET | Refills: 0 | Status: SHIPPED | OUTPATIENT
Start: 2018-12-18 | End: 2019-01-08

## 2018-12-18 RX ORDER — DESONIDE 0.5 MG/G
CREAM TOPICAL
Qty: 1 TUBE | Refills: 1 | Status: SHIPPED | OUTPATIENT
Start: 2018-12-18 | End: 2018-12-18 | Stop reason: ALTCHOICE

## 2018-12-18 RX ORDER — MULTIVIT-MIN/IRON/FOLIC ACID/K 18-600-40
CAPSULE ORAL
COMMUNITY

## 2018-12-18 NOTE — TELEPHONE ENCOUNTER
RN returned call to patient. Per patient was seen by SJM today and given Rx for recurring vaginal irritation. Per patient, was advised to complete course of medication and follow up in 3 weeks.   Pt indicates that SJM mentioned a possible biopsy or closer

## 2018-12-18 NOTE — PROGRESS NOTES
HPI:    Patient ID: Carlo Gannon is a 51year old female. For many years she is having skin irritation externally to the vagina. Itching and burning. Once in a while will excoriate to the point of bleeding.   She believes this has been going on sinc [place]en apply to perineal area twice daily for 7 days  4.   RTC 2-4 weeks with Dr. George Quiroz to reevaluate after treatment    Orders Placed This Encounter      Vaginal Culture      Meds This Visit:  Requested Prescriptions     Signed Prescriptions Disp Refills

## 2018-12-19 ENCOUNTER — TELEPHONE (OUTPATIENT)
Dept: OBGYN CLINIC | Facility: CLINIC | Age: 48
End: 2018-12-19

## 2018-12-19 NOTE — TELEPHONE ENCOUNTER
Diflucan 1 tab daily for 7 days ordered yesterday by CALLIE. Pt voices when she talked to Dr. Michael Medina yesterday, he told her to take 1 tablet every 3 days x 3 tablets.  Pt just wants to clarify because her prescription she picked up yesterday says 1 tab daily fo

## 2018-12-19 NOTE — TELEPHONE ENCOUNTER
Pt would like to confirm which rx she is to  from Pharmacy.  Pt states 2 of the same rx were sent to pharmacy

## 2018-12-24 ENCOUNTER — TELEPHONE (OUTPATIENT)
Dept: OBGYN CLINIC | Facility: CLINIC | Age: 48
End: 2018-12-24

## 2018-12-24 NOTE — TELEPHONE ENCOUNTER
Informed pt of message below--    --- Message from GINO Luz sent at 12/22/2018  6:39 PM CST -----  No evidence of yeast.  The test for bacteria was inconclusive. Continue with appt with Dr. Roger Aldrich      Pt voices understanding.

## 2019-01-08 ENCOUNTER — OFFICE VISIT (OUTPATIENT)
Dept: OBGYN CLINIC | Facility: CLINIC | Age: 49
End: 2019-01-08
Payer: COMMERCIAL

## 2019-01-08 VITALS
BODY MASS INDEX: 19 KG/M2 | DIASTOLIC BLOOD PRESSURE: 85 MMHG | HEART RATE: 98 BPM | WEIGHT: 115 LBS | SYSTOLIC BLOOD PRESSURE: 148 MMHG

## 2019-01-08 DIAGNOSIS — N76.3 SUBACUTE VULVITIS: Primary | ICD-10-CM

## 2019-01-08 PROBLEM — L72.3 SEBACEOUS CYST: Status: RESOLVED | Noted: 2018-04-23 | Resolved: 2019-01-08

## 2019-01-08 PROBLEM — L73.9 INFLAMED HAIR FOLLICLE: Status: RESOLVED | Noted: 2018-06-28 | Resolved: 2019-01-08

## 2019-01-08 PROCEDURE — 99212 OFFICE O/P EST SF 10 MIN: CPT | Performed by: OBSTETRICS & GYNECOLOGY

## 2019-01-08 NOTE — PROGRESS NOTES
HPI:    Patient ID: Wesley Liu is a 51year old female. HPI  GYN problem visit follow-up  Use steroid cream and Diflucan orally. Feels better. Slight vulvar itching or irritation but minimal.  No discharge.    Review of Systems  neg       Current

## 2019-01-18 ENCOUNTER — HOSPITAL ENCOUNTER (OUTPATIENT)
Dept: GENERAL RADIOLOGY | Facility: HOSPITAL | Age: 49
Discharge: HOME | End: 2019-01-18
Attending: INTERNAL MEDICINE
Payer: COMMERCIAL

## 2019-01-18 DIAGNOSIS — R05.9 COUGH: ICD-10-CM

## 2019-01-18 PROCEDURE — 71046 X-RAY EXAM CHEST 2 VIEWS: CPT | Performed by: INTERNAL MEDICINE

## 2020-05-15 ENCOUNTER — HOSPITAL ENCOUNTER (OUTPATIENT)
Dept: CV DIAGNOSTICS | Facility: HOSPITAL | Age: 50
Discharge: HOME | End: 2020-05-15
Attending: INTERNAL MEDICINE
Payer: COMMERCIAL

## 2020-05-15 DIAGNOSIS — I38 ENDOCARDITIS, UNSPECIFIED CHRONICITY, UNSPECIFIED ENDOCARDITIS TYPE: ICD-10-CM

## 2020-05-15 PROCEDURE — 93306 TTE W/DOPPLER COMPLETE: CPT | Performed by: INTERNAL MEDICINE

## 2020-05-18 NOTE — TELEPHONE ENCOUNTER
Voicemail message received from patient, asking for my assistance with care coordination. Returned call to patient 7:44am.  Patient shares that she has decided to see Dr. Marisol Tee at [facility].   She has had contact with the RN Coordinator, who is offering an Face Mask

## 2021-01-07 ENCOUNTER — HOSPITAL ENCOUNTER (OUTPATIENT)
Dept: CV DIAGNOSTICS | Facility: HOSPITAL | Age: 51
Discharge: HOME | End: 2021-01-07
Attending: INTERNAL MEDICINE
Payer: COMMERCIAL

## 2021-01-07 DIAGNOSIS — L60.8 SPLINTER HEMORRHAGE OF FINGERNAIL: ICD-10-CM

## 2021-01-07 DIAGNOSIS — L60.8 SPLINTER HEMORRHAGE OF TOENAIL: ICD-10-CM

## 2021-01-07 DIAGNOSIS — C50.919 MALIGNANT NEOPLASM OF BREAST (FEMALE) (HCC): ICD-10-CM

## 2021-01-07 PROCEDURE — 93306 TTE W/DOPPLER COMPLETE: CPT | Performed by: INTERNAL MEDICINE

## 2021-07-15 ENCOUNTER — HOSPITAL ENCOUNTER (OUTPATIENT)
Dept: GENERAL RADIOLOGY | Facility: HOSPITAL | Age: 51
Discharge: HOME | End: 2021-07-15
Attending: INTERNAL MEDICINE
Payer: COMMERCIAL

## 2021-07-15 ENCOUNTER — HOSPITAL ENCOUNTER (OUTPATIENT)
Dept: MRI IMAGING | Facility: HOSPITAL | Age: 51
Discharge: HOME | End: 2021-07-15
Attending: INTERNAL MEDICINE
Payer: COMMERCIAL

## 2021-07-15 DIAGNOSIS — S93.402A LEFT ANKLE SPRAIN: ICD-10-CM

## 2021-07-15 DIAGNOSIS — M25.572 LEFT ANKLE PAIN, UNSPECIFIED CHRONICITY: ICD-10-CM

## 2021-07-15 PROCEDURE — 73721 MRI JNT OF LWR EXTRE W/O DYE: CPT | Performed by: INTERNAL MEDICINE

## 2021-07-15 PROCEDURE — 73610 X-RAY EXAM OF ANKLE: CPT | Performed by: INTERNAL MEDICINE

## 2021-08-20 ENCOUNTER — TELEPHONE (OUTPATIENT)
Dept: OPHTHALMOLOGY | Facility: CLINIC | Age: 51
End: 2021-08-20

## 2021-08-20 NOTE — TELEPHONE ENCOUNTER
Pt called stating pt had seen Dr. Graeme Velazquez over 5 years ago. Pt has a bump inside lower lid of right eye for 2 months. Pt requesting an appointment. Pt has history of cancer.  Please call

## 2021-08-23 NOTE — TELEPHONE ENCOUNTER
OV booked Thursday at 1:00 with ANDER GUTHRIE x 2 weeks. No improvement with Drops, warm compresses or antibiotics.

## 2021-08-26 ENCOUNTER — OFFICE VISIT (OUTPATIENT)
Dept: OPHTHALMOLOGY | Facility: CLINIC | Age: 51
End: 2021-08-26
Payer: COMMERCIAL

## 2021-08-26 DIAGNOSIS — H01.00B BLEPHARITIS OF UPPER AND LOWER EYELIDS OF BOTH EYES, UNSPECIFIED TYPE: ICD-10-CM

## 2021-08-26 DIAGNOSIS — H00.12 CHALAZION RIGHT LOWER EYELID: Primary | ICD-10-CM

## 2021-08-26 DIAGNOSIS — H01.00A BLEPHARITIS OF UPPER AND LOWER EYELIDS OF BOTH EYES, UNSPECIFIED TYPE: ICD-10-CM

## 2021-08-26 PROCEDURE — 99202 OFFICE O/P NEW SF 15 MIN: CPT | Performed by: OPHTHALMOLOGY

## 2021-08-26 NOTE — ASSESSMENT & PLAN NOTE
Reassured patient that this is a benign finding. Diagnosis discussed with patient. Chalazion info given. If she gets another chalazion/ stye - Recommend aggressive warm compresses; patient should use them 20-30 times per day.   Information on chalazi

## 2021-08-26 NOTE — PATIENT INSTRUCTIONS
Chalazion right lower eyelid  Reassured patient that this is a benign finding. Diagnosis discussed with patient. Chalazion info given.    If she gets another chalazion/ stye - Recommend aggressive warm compresses; patient should use them 20-30 times pe remove it. Theresa last reviewed this educational content on 3/1/2018  © 8872-2182 [employer]. All rights reserved. This information is not intended as a substitute for professional medical care.  Always follow your healthcare professional' normal color or may be red. A chalazion is usually not painful. But it can cause mild pain, soreness, sensitivity to light, eye discharge, and increased tearing. A chalazion often lasts from a few weeks to a month. It often goes away on its own.  A chalazi ophthalmologist) for further evaluation and treatment. You may also be referred to an eye specialist if you have a large chalazion.   When to seek medical advice  Call your healthcare provider right away if any of these occur:  · Filiberto Sales returns to the [place]

## 2021-08-26 NOTE — PROGRESS NOTES
Osman Jacobs is a 45year old female. HPI:     HPI     NP here for an evaluation of stye on RLL x 2 months. Pt has small bump inside RLL x 2 months that has not gone away with warm compresses.  Pt currently using warm compresses 2-3 times a day, also NODE BIOPSY); mastectomy left; mastectomy right; colonoscopy (@5049); colonoscopy (05/2019) (normal); and colonoscopy (N/A, 5/23/2019) (Procedure: COLONOSCOPY, POSSIBLE BIOPSY, POSSIBLE POLYPECTOMY 75135;  Surgeon: Lizzette Caro MD;  Location: [facility] SURG 090 1. 50    Left -7.50 +1.25 060 1.50    Age: 6m    Type: Progressive bifocal                 ASSESSMENT/PLAN:     Diagnoses and Plan:     Chalazion right lower eyelid  Reassured patient that this is a benign finding. Diagnosis discussed with patient.

## 2022-01-28 ENCOUNTER — HOSPITAL ENCOUNTER (EMERGENCY)
Facility: HOSPITAL | Age: 52
Discharge: HOME | End: 2022-01-28
Attending: STUDENT IN AN ORGANIZED HEALTH CARE EDUCATION/TRAINING PROGRAM
Payer: COMMERCIAL

## 2022-01-28 ENCOUNTER — APPOINTMENT (OUTPATIENT)
Dept: GENERAL RADIOLOGY | Facility: HOSPITAL | Age: 52
End: 2022-01-28
Attending: STUDENT IN AN ORGANIZED HEALTH CARE EDUCATION/TRAINING PROGRAM
Payer: COMMERCIAL

## 2022-01-28 VITALS
HEART RATE: 90 BPM | DIASTOLIC BLOOD PRESSURE: 74 MMHG | WEIGHT: 120 LBS | SYSTOLIC BLOOD PRESSURE: 128 MMHG | OXYGEN SATURATION: 97 % | HEIGHT: 66 IN | TEMPERATURE: 97 F | BODY MASS INDEX: 19.29 KG/M2 | RESPIRATION RATE: 18 BRPM

## 2022-01-28 DIAGNOSIS — S43.401A SPRAIN OF RIGHT SHOULDER, UNSPECIFIED SHOULDER SPRAIN TYPE, INITIAL ENCOUNTER: Primary | ICD-10-CM

## 2022-01-28 DIAGNOSIS — S00.33XA CONTUSION OF NOSE, INITIAL ENCOUNTER: ICD-10-CM

## 2022-01-28 PROCEDURE — 90471 IMMUNIZATION ADMIN: CPT

## 2022-01-28 PROCEDURE — 73030 X-RAY EXAM OF SHOULDER: CPT | Performed by: STUDENT IN AN ORGANIZED HEALTH CARE EDUCATION/TRAINING PROGRAM

## 2022-01-28 PROCEDURE — 70160 X-RAY EXAM OF NASAL BONES: CPT | Performed by: STUDENT IN AN ORGANIZED HEALTH CARE EDUCATION/TRAINING PROGRAM

## 2022-01-28 PROCEDURE — 99284 EMERGENCY DEPT VISIT MOD MDM: CPT

## 2022-01-28 RX ORDER — GABAPENTIN 100 MG/1
100 CAPSULE ORAL 3 TIMES DAILY
Qty: 60 CAPSULE | Refills: 0 | Status: SHIPPED | OUTPATIENT
Start: 2022-01-28 | End: 2022-01-28

## 2022-01-28 RX ORDER — HYDROCODONE BITARTRATE AND ACETAMINOPHEN 5; 325 MG/1; MG/1
1-2 TABLET ORAL EVERY 8 HOURS PRN
Qty: 10 TABLET | Refills: 0 | Status: SHIPPED | OUTPATIENT
Start: 2022-01-28 | End: 2022-01-28

## 2022-01-28 RX ORDER — LEVOTHYROXINE SODIUM 88 UG/1
88 TABLET ORAL
COMMUNITY

## 2022-01-28 RX ORDER — IBUPROFEN 600 MG/1
600 TABLET ORAL ONCE
Status: COMPLETED | OUTPATIENT
Start: 2022-01-28 | End: 2022-01-28

## 2022-01-29 NOTE — ED PROVIDER NOTES
Patient Seen in: [facility] AND [facility] Emergency Department      History   Patient presents with:  Fall    Stated Complaint: fall    Subjective:   HPI    24-year-old female presents with facial and right shoulder injury after fall.   She tripped on a backpack 2001    removal of ectopic pregnancy - tube intact                Social History    Tobacco Use      Smoking status: Never Smoker      Smokeless tobacco: Never Used    Vaping Use      Vaping Use: Never used    Alcohol use: Not Currently      Alcohol/week: 5/5  shoulder internal/external rotation with pain  no sternoclavicular, acromioclavicular, or coracoid TTP  drop arm test neg  radial pulse 2+, cap refill all digits <2sec  sensation intact and equal all digits  thumb + index finger ring strength normal shoulder, unspecified shoulder sprain type, initial encounter  (primary encounter diagnosis)  Contusion of nose, initial encounter     Disposition:  Discharge  1/28/2022  8:48 pm    Follow-up:  Jay Gauthier MD  [id number] [phone]  [phone]

## 2022-01-29 NOTE — ED INITIAL ASSESSMENT (HPI)
Patient presents to ER with complaints of pain to nose, and right arm post fall. Patient notes tripping and falling hitting her face. Right upper arm pain. No LOC. No thinners.

## 2022-07-01 ENCOUNTER — HOSPITAL ENCOUNTER (OUTPATIENT)
Dept: ULTRASOUND IMAGING | Facility: HOSPITAL | Age: 52
Discharge: HOME | End: 2022-07-01
Attending: INTERNAL MEDICINE
Payer: COMMERCIAL

## 2022-07-01 DIAGNOSIS — I82.409 DVT (DEEP VENOUS THROMBOSIS) (HCC): ICD-10-CM

## 2022-07-01 PROCEDURE — 93970 EXTREMITY STUDY: CPT | Performed by: INTERNAL MEDICINE

## 2023-04-17 ENCOUNTER — OFFICE VISIT (OUTPATIENT)
Dept: OBGYN | Age: 53
End: 2023-04-17

## 2023-04-17 VITALS
HEIGHT: 66 IN | BODY MASS INDEX: 18.78 KG/M2 | HEART RATE: 73 BPM | DIASTOLIC BLOOD PRESSURE: 93 MMHG | SYSTOLIC BLOOD PRESSURE: 139 MMHG | WEIGHT: 116.84 LBS | TEMPERATURE: 97.8 F

## 2023-04-17 DIAGNOSIS — L72.3 SEBACEOUS CYST: ICD-10-CM

## 2023-04-17 DIAGNOSIS — N90.89 VULVAR MASS: Primary | ICD-10-CM

## 2023-04-17 PROCEDURE — 99204 OFFICE O/P NEW MOD 45 MIN: CPT | Performed by: OBSTETRICS & GYNECOLOGY

## 2023-04-17 RX ORDER — TRIAMCINOLONE ACETONIDE 1 MG/G
OINTMENT TOPICAL
COMMUNITY
Start: 2022-10-24

## 2023-04-17 RX ORDER — ESTRADIOL 10 UG/1
INSERT VAGINAL
COMMUNITY
Start: 2022-11-14

## 2023-04-17 RX ORDER — LEVOTHYROXINE SODIUM 88 UG/1
TABLET ORAL
COMMUNITY

## 2023-04-17 RX ORDER — DIAZEPAM 5 MG/1
TABLET ORAL
COMMUNITY

## 2025-01-30 NOTE — H&P
[facility]    PATIENT'S NAME: SHANDA TABOR   ATTENDING PHYSICIAN: Ramesh Baron MD   PATIENT ACCOUNT#:   [id number]    LOCATION:    MEDICAL RECORD #:   [id number]       YOB: 1970  ADMISSION DATE:       2025    HISTORY AND PHYSICAL EXAMINATION    HISTORY OF PRESENT ILLNESS:  The patient is a 54-year-old female with a partial-thickness tear of her subscapularis.  She has failed conservative management of physical therapy and corticosteroid injections.  She has persistent pain.  At this time she is interested in proceeding with a left shoulder arthroscopic debridement, possible rotator cuff repair, possible biceps tenodesis.      PAST MEDICAL HISTORY:  Significant for vitamin B12 deficiency, breast cancer, hemangioma, histiocytoma, hypothyroidism, vitamin D deficiency, hearing loss, lichen sclerosus, osteopenia, osteoporosis.    PAST SURGICAL HISTORY:  Significant for  x3, ectopic pregnancy surgery, bunionectomy bilaterally, oophorectomy, and bilateral mastectomy.    MEDICATIONS:  Current medications:  Alendronate sodium, Estradiol, Synthroid, vitamin B12, vitamin D.    ALLERGIES:  Lisinopril.    PHYSICAL EXAMINATION:  Her clinical examination demonstrates her elevation is 160, external rotation is 60, internal rotation L5.  She has a positive Neer, negative Crossover, positive painful arc, positive Mann, positive lift-off test, negative Speed.  Her strength of elevation, external rotation, internal rotation is 5-/5.    An MRI scan demonstrates a partial tear of her subscapularis.      ASSESSMENT AND PLAN:  The patient is a 54-year-old female with symptomatic partial-thickness tear of her left shoulder.  She failed conservative management.  At this time we will proceed with a left shoulder arthroscopic debridement, possible rotator cuff repair, possible biceps tenodesis.  We discussed the risks and complications associated with surgery.  She would like to proceed at  this time.    Marilu Haynes PA-C, dictating for Ramesh Baron MD.      Dictated By Ramesh Baron MD  d: 01/30/2025 08:06:39  t: 01/30/2025 08:28:52  [facility] [id number]  /

## 2025-02-01 RX ORDER — ALENDRONATE SODIUM 35 MG/1
35 TABLET ORAL
COMMUNITY

## 2025-02-01 RX ORDER — MULTIVITAMIN WITH IRON
50 TABLET ORAL DAILY
COMMUNITY

## 2025-02-05 ENCOUNTER — HOSPITAL ENCOUNTER (OUTPATIENT)
Facility: HOSPITAL | Age: 55
Setting detail: HOSPITAL OUTPATIENT SURGERY
Discharge: HOME | End: 2025-02-05
Attending: ORTHOPAEDIC SURGERY | Admitting: ORTHOPAEDIC SURGERY
Payer: COMMERCIAL

## 2025-02-05 ENCOUNTER — ANESTHESIA (OUTPATIENT)
Dept: SURGERY | Facility: HOSPITAL | Age: 55
End: 2025-02-05
Payer: COMMERCIAL

## 2025-02-05 ENCOUNTER — ANESTHESIA EVENT (OUTPATIENT)
Dept: SURGERY | Facility: HOSPITAL | Age: 55
End: 2025-02-05
Payer: COMMERCIAL

## 2025-02-05 VITALS
BODY MASS INDEX: 18.48 KG/M2 | WEIGHT: 115 LBS | HEART RATE: 69 BPM | DIASTOLIC BLOOD PRESSURE: 81 MMHG | SYSTOLIC BLOOD PRESSURE: 111 MMHG | HEIGHT: 66 IN | RESPIRATION RATE: 17 BRPM | TEMPERATURE: 98 F | OXYGEN SATURATION: 99 %

## 2025-02-05 DIAGNOSIS — M75.122 COMPLETE TEAR OF LEFT ROTATOR CUFF, UNSPECIFIED WHETHER TRAUMATIC: Primary | ICD-10-CM

## 2025-02-05 PROCEDURE — 0LQ24ZZ REPAIR LEFT SHOULDER TENDON, PERCUTANEOUS ENDOSCOPIC APPROACH: ICD-10-PCS | Performed by: ORTHOPAEDIC SURGERY

## 2025-02-05 PROCEDURE — 64415 NJX AA&/STRD BRCH PLXS IMG: CPT | Performed by: ORTHOPAEDIC SURGERY

## 2025-02-05 PROCEDURE — 0RNK4ZZ RELEASE LEFT SHOULDER JOINT, PERCUTANEOUS ENDOSCOPIC APPROACH: ICD-10-PCS | Performed by: ORTHOPAEDIC SURGERY

## 2025-02-05 DEVICE — SUTR ANCH,CRKSCRW
Type: IMPLANTABLE DEVICE | Site: SHOULDER | Status: FUNCTIONAL
Brand: ARTHREX®

## 2025-02-05 RX ORDER — HYDROMORPHONE HYDROCHLORIDE 1 MG/ML
0.6 INJECTION, SOLUTION INTRAMUSCULAR; INTRAVENOUS; SUBCUTANEOUS EVERY 5 MIN PRN
OUTPATIENT
Start: 2025-02-05

## 2025-02-05 RX ORDER — DEXAMETHASONE SODIUM PHOSPHATE 10 MG/ML
INJECTION, SOLUTION INTRAMUSCULAR; INTRAVENOUS
Status: DISCONTINUED | OUTPATIENT
Start: 2025-02-05 | End: 2025-02-05 | Stop reason: SURG

## 2025-02-05 RX ORDER — LIDOCAINE HYDROCHLORIDE 10 MG/ML
INJECTION, SOLUTION INFILTRATION; PERINEURAL
Status: DISCONTINUED | OUTPATIENT
Start: 2025-02-05 | End: 2025-02-05 | Stop reason: SURG

## 2025-02-05 RX ORDER — CEPHALEXIN 500 MG/1
500 CAPSULE ORAL 4 TIMES DAILY
Status: SHIPPED | COMMUNITY

## 2025-02-05 RX ORDER — LIDOCAINE HYDROCHLORIDE 10 MG/ML
INJECTION, SOLUTION EPIDURAL; INFILTRATION; INTRACAUDAL; PERINEURAL AS NEEDED
Status: DISCONTINUED | OUTPATIENT
Start: 2025-02-05 | End: 2025-02-05 | Stop reason: SURG

## 2025-02-05 RX ORDER — MORPHINE SULFATE 2 MG/ML
2 INJECTION, SOLUTION INTRAMUSCULAR; INTRAVENOUS EVERY 10 MIN PRN
OUTPATIENT
Start: 2025-02-05

## 2025-02-05 RX ORDER — HYDROCODONE BITARTRATE AND ACETAMINOPHEN 10; 325 MG/1; MG/1
TABLET ORAL
Status: SHIPPED | COMMUNITY

## 2025-02-05 RX ORDER — DICLOFENAC SODIUM 75 MG/1
75 TABLET, DELAYED RELEASE ORAL 2 TIMES DAILY
Status: SHIPPED | COMMUNITY
Start: 2025-02-05

## 2025-02-05 RX ORDER — MORPHINE SULFATE 4 MG/ML
4 INJECTION, SOLUTION INTRAMUSCULAR; INTRAVENOUS EVERY 10 MIN PRN
OUTPATIENT
Start: 2025-02-05

## 2025-02-05 RX ORDER — ROCURONIUM BROMIDE 10 MG/ML
INJECTION, SOLUTION INTRAVENOUS AS NEEDED
Status: DISCONTINUED | OUTPATIENT
Start: 2025-02-05 | End: 2025-02-05 | Stop reason: SURG

## 2025-02-05 RX ORDER — ONDANSETRON 2 MG/ML
INJECTION INTRAMUSCULAR; INTRAVENOUS AS NEEDED
Status: DISCONTINUED | OUTPATIENT
Start: 2025-02-05 | End: 2025-02-05 | Stop reason: SURG

## 2025-02-05 RX ORDER — ACETAMINOPHEN 500 MG
1000 TABLET ORAL ONCE
Status: COMPLETED | OUTPATIENT
Start: 2025-02-05 | End: 2025-02-05

## 2025-02-05 RX ORDER — ROPIVACAINE HYDROCHLORIDE 5 MG/ML
INJECTION, SOLUTION EPIDURAL; INFILTRATION; PERINEURAL
Status: DISCONTINUED | OUTPATIENT
Start: 2025-02-05 | End: 2025-02-05 | Stop reason: SURG

## 2025-02-05 RX ORDER — MORPHINE SULFATE 10 MG/ML
6 INJECTION, SOLUTION INTRAMUSCULAR; INTRAVENOUS EVERY 10 MIN PRN
OUTPATIENT
Start: 2025-02-05

## 2025-02-05 RX ORDER — ONDANSETRON 4 MG/1
4 TABLET, FILM COATED ORAL EVERY 8 HOURS PRN
Status: SHIPPED | COMMUNITY

## 2025-02-05 RX ORDER — ESTRADIOL 0.1 MG/G
1 CREAM VAGINAL DAILY
COMMUNITY

## 2025-02-05 RX ORDER — DEXAMETHASONE SODIUM PHOSPHATE 4 MG/ML
VIAL (ML) INJECTION AS NEEDED
Status: DISCONTINUED | OUTPATIENT
Start: 2025-02-05 | End: 2025-02-05 | Stop reason: SURG

## 2025-02-05 RX ORDER — EPHEDRINE SULFATE 50 MG/ML
INJECTION, SOLUTION INTRAVENOUS AS NEEDED
Status: DISCONTINUED | OUTPATIENT
Start: 2025-02-05 | End: 2025-02-05 | Stop reason: SURG

## 2025-02-05 RX ORDER — SODIUM CHLORIDE, SODIUM LACTATE, POTASSIUM CHLORIDE, CALCIUM CHLORIDE 600; 310; 30; 20 MG/100ML; MG/100ML; MG/100ML; MG/100ML
INJECTION, SOLUTION INTRAVENOUS CONTINUOUS
Status: DISCONTINUED | OUTPATIENT
Start: 2025-02-05 | End: 2025-02-05

## 2025-02-05 RX ORDER — HYDROMORPHONE HYDROCHLORIDE 1 MG/ML
0.2 INJECTION, SOLUTION INTRAMUSCULAR; INTRAVENOUS; SUBCUTANEOUS EVERY 5 MIN PRN
OUTPATIENT
Start: 2025-02-05

## 2025-02-05 RX ORDER — MORPHINE SULFATE 15 MG/1
15 TABLET, FILM COATED, EXTENDED RELEASE ORAL EVERY 12 HOURS SCHEDULED
Status: SHIPPED | COMMUNITY
Start: 2025-02-05

## 2025-02-05 RX ORDER — ONDANSETRON 2 MG/ML
4 INJECTION INTRAMUSCULAR; INTRAVENOUS EVERY 6 HOURS PRN
Status: DISCONTINUED | OUTPATIENT
Start: 2025-02-05 | End: 2025-02-05

## 2025-02-05 RX ORDER — SODIUM CHLORIDE, SODIUM LACTATE, POTASSIUM CHLORIDE, CALCIUM CHLORIDE 600; 310; 30; 20 MG/100ML; MG/100ML; MG/100ML; MG/100ML
INJECTION, SOLUTION INTRAVENOUS CONTINUOUS
OUTPATIENT
Start: 2025-02-05

## 2025-02-05 RX ORDER — HYDROMORPHONE HYDROCHLORIDE 1 MG/ML
0.4 INJECTION, SOLUTION INTRAMUSCULAR; INTRAVENOUS; SUBCUTANEOUS EVERY 5 MIN PRN
OUTPATIENT
Start: 2025-02-05

## 2025-02-05 RX ORDER — NALOXONE HYDROCHLORIDE 0.4 MG/ML
0.08 INJECTION, SOLUTION INTRAMUSCULAR; INTRAVENOUS; SUBCUTANEOUS AS NEEDED
OUTPATIENT
Start: 2025-02-05 | End: 2025-02-05

## 2025-02-05 RX ADMIN — ONDANSETRON 4 MG: 2 INJECTION INTRAMUSCULAR; INTRAVENOUS at 07:58:00

## 2025-02-05 RX ADMIN — ROPIVACAINE HYDROCHLORIDE 30 ML: 5 INJECTION, SOLUTION EPIDURAL; INFILTRATION; PERINEURAL at 07:00:00

## 2025-02-05 RX ADMIN — ROCURONIUM BROMIDE 50 MG: 10 INJECTION, SOLUTION INTRAVENOUS at 07:21:00

## 2025-02-05 RX ADMIN — DEXAMETHASONE SODIUM PHOSPHATE 10 MG: 10 INJECTION, SOLUTION INTRAMUSCULAR; INTRAVENOUS at 07:00:00

## 2025-02-05 RX ADMIN — EPHEDRINE SULFATE 10 MG: 50 INJECTION, SOLUTION INTRAVENOUS at 07:47:00

## 2025-02-05 RX ADMIN — LIDOCAINE HYDROCHLORIDE 40 MG: 10 INJECTION, SOLUTION EPIDURAL; INFILTRATION; INTRACAUDAL; PERINEURAL at 07:20:00

## 2025-02-05 RX ADMIN — SODIUM CHLORIDE, SODIUM LACTATE, POTASSIUM CHLORIDE, CALCIUM CHLORIDE: 600; 310; 30; 20 INJECTION, SOLUTION INTRAVENOUS at 08:13:00

## 2025-02-05 RX ADMIN — LIDOCAINE HYDROCHLORIDE 5 ML: 10 INJECTION, SOLUTION INFILTRATION; PERINEURAL at 07:00:00

## 2025-02-05 RX ADMIN — DEXAMETHASONE SODIUM PHOSPHATE 4 MG: 4 MG/ML VIAL (ML) INJECTION at 07:24:00

## 2025-02-05 NOTE — ANESTHESIA POSTPROCEDURE EVALUATION
97Patient: Mary Pisano    Procedure Summary       Date: 02/05/25 Room / Location: [facility] MAIN OR [address] [place] [address] MAIN OR    Anesthesia Start: 0713 Anesthesia Stop: 0824    Procedure: Left shoulder arthroscopic debridement, rotator cuff repair and decompression (Left: Shoulder) Diagnosis: (Rotator cuff tear)    Surgeons: Ramesh Baron MD Anesthesiologist: Michael Arellano MD    Anesthesia Type: general, regional ASA Status: 2            Anesthesia Type: general, regional    Vitals Value Taken Time   /80 02/05/25 0817   Temp 97 02/05/25 0824   Pulse 67 02/05/25 0823   Resp 12 02/05/25 0823   SpO2 99 % 02/05/25 0823   Vitals shown include unfiled device data.    [facility] AN Post Evaluation:   Patient Evaluated in PACU  Patient Participation: complete - patient participated  Level of Consciousness: awake  Pain Score: 0  Pain Management: adequate  Airway Patency:patent  Dental exam unchanged from preop  Yes    Nausea/Vomiting: none  Cardiovascular Status: acceptable  Respiratory Status: acceptable  Postoperative Hydration acceptable      Bonny Spivey CRNA  2/5/2025 8:24 AM

## 2025-02-05 NOTE — ANESTHESIA PROCEDURE NOTES
Peripheral Block    Date/Time: 2/5/2025 7:00 AM    Performed by: Michael Arellano MD  Authorized by: Michael Arellano MD      General Information and Staff    Start Time:  2/5/2025 7:00 AM  End Time:  2/5/2025 7:05 AM  Anesthesiologist:  Michael Arellano MD  Performed by:  Anesthesiologist  Patient Location:  PACU    Block Placement: Pre Induction  Site Identification: real time ultrasound guided and image stored and retrievable    Block site/laterality marked before start: site marked  Reason for Block: at surgeon's request and post-op pain management    Preanesthetic Checklist: 2 patient identifers, IV checked, risks and benefits discussed, monitors and equipment checked, pre-op evaluation, timeout performed, anesthesia consent and sterile technique used      Procedure Details    Patient Position:  Sitting  Prep: ChloraPrep    Monitoring:  Cardiac monitor  Block Type:  Interscalene  Laterality:  Left  Injection Technique:  Single-shot    Needle    Needle Type:  Short-bevel  Needle Gauge:  22 G  Needle Length:  50 mm  Needle Localization:  Ultrasound guidance  Reason for Ultrasound Use: appropriate spread of the medication was noted in real time and no ultrasound evidence of intravascular and/or intraneural injection            Assessment    Injection Assessment:  Good spread noted, incremental injection, local visualized surrounding nerve on ultrasound, low pressure, negative aspiration for heme and no pain on injection  Heart Rate Change: No    - Patient tolerated block procedure well without evidence of immediate block related complications.     Medications  2/5/2025 7:00 AM  fentaNYL (SUBLIMAZE) injection 50mcg/ml - Intravenous   50 mcg - 2/5/2025 7:00:00 AM  ropivacaine (NAROPIN) injection 0.5% - Infiltration   30 mL - 2/5/2025 7:00:00 AM  lidocaine injection 1% - Intradermal   5 mL - 2/5/2025 7:00:00 AM  dexamethasone (DECADRON) PF injection 10 mg/ml - Injection   10 mg - 2/5/2025 7:00:00 AM    Additional  Comments

## 2025-02-05 NOTE — BRIEF OP NOTE
Pre-Operative Diagnosis: Rotator cuff tear     Post-Operative Diagnosis: Rotator cuff tear      Procedure Performed:   Left shoulder arthroscopic debridement, rotator cuff repair and decompression  Capsular release  Surgeons and Role:     * Ramesh Baron MD - Primary    Assistant(s):  Surgical Assistant.: Marcin Walker  PA: Disha Damon PA     Surgical Findings: same     Specimen: none     Estimated Blood Loss: No data recorded        DISHA DAMON PA  2/5/2025  8:18 AM

## 2025-02-05 NOTE — ANESTHESIA PREPROCEDURE EVALUATION
Anesthesia PreOp Note    HPI:     Mary Pisano is a 54 year old female who presents for preoperative consultation requested by: Ramesh Baron MD    Date of Surgery: 2025    Procedure(s):  Left shoulder arthroscopic debridement, possible rotator cuff repair, possible biceps tenodesis  Indication: Rotator cuff tear    Relevant Problems   No relevant active problems       NPO:  Last Liquid Consumption Date: 25  Last Liquid Consumption Time: 2245  Last Solid Consumption Date: 25  Last Solid Consumption Time:   Last Liquid Consumption Date: 25          History Review:  Patient Active Problem List    Diagnosis Date Noted    Chalazion right lower eyelid 2021    Blepharitis of upper and lower eyelids of both eyes 2021    Subacute vulvitis 2019    Vulvar irritation 2018    Hypertension 2017    Neoplasm of left breast, regional lymph node staging category pN0 per American Joint Committee on Cancer Staging Guidelines, 7th edition (HCC) 2017    Dense breasts 2017    Cough in adult 2017    Breast cancer of lower-outer quadrant of right female breast (HCC) 2017    Lump of right breast 2015       Past Medical History:    Blepharitis    OU    Breast cancer (HCC)    Cancer (HCC)    Ectopic pregnancy ([facility])    removal of ectopic pregnancy - tube intact    Exotropia    OD    Hemorrhage    possible hemorrhage after delivery, D&C    History of pregnancy    -Pregnancy blood clot in placenta bed rest [id number], ; -Pregnancy bed rest [id number]  labor symptoms,  section TWINS; - section with TUBAL    Hypertension    Myopia with astigmatism    OU    Ophthalmological disorder    Increased C/D ratio, OU    PONV (postoperative nausea and vomiting)    Thyroid disease    Visual impairment       Past Surgical History:   Procedure Laterality Date      ,     Colonoscopy  @    Colonoscopy  2019     normal    Colonoscopy N/A 2019    Procedure: COLONOSCOPY, POSSIBLE BIOPSY, POSSIBLE POLYPECTOMY 13576;  Surgeon: Skyler Rodriguez MD;  Location: [facility]   , 2006-possible hemorrhage after delivery    Foot surgery  1992    bunionectomy    Mastectomy left      Mastectomy right        2002    Oophorectomy Bilateral 2018    Other surgical history  2018    BILATERAL MASTECTOMY W/ RIGHT SENTINAL LYMPH NODE BIOPSY    Treat ectopic preg,non remval      removal of ectopic pregnancy - tube intact       Prescriptions Prior to Admission[1]  Current Medications and Prescriptions Ordered in Epic[2]    Allergies[3]    Family History   Problem Relation Age of Onset    Diabetes Other         aunt    Ovarian Cancer Other     Cancer Father 65        prostate ca; sx    Breast Cancer Maternal Cousin Female     Glaucoma Neg      Social History     Socioeconomic History    Marital status:    Tobacco Use    Smoking status: Never    Smokeless tobacco: Never   Vaping Use    Vaping status: Never Used   Substance and Sexual Activity    Alcohol use: Not Currently     Alcohol/week: 0.0 standard drinks of alcohol     Comment: 1 glass, occasionally    Drug use: No    Sexual activity: Yes   Other Topics Concern    Caffeine Concern Yes     Comment: soda, 2 cups daily       Available pre-op labs reviewed.             Vital Signs:  Body mass index is 18.56 kg/m².   height is 1.676 m (5' 6\") and weight is 52.2 kg (115 lb). Her oral temperature is 98 °F (36.7 °C). Her blood pressure is 123/80 and her pulse is 70. Her respiration is 18 and oxygen saturation is 98%.   Vitals:    25 1037 25 0604   BP:  123/80   Pulse:  70   Resp:  18   Temp:  98 °F (36.7 °C)   TempSrc:  Oral   SpO2:  98%   Weight: 54.4 kg (120 lb) 52.2 kg (115 lb)   Height: 1.676 m (5' 6\") 1.676 m (5' 6\")        Anesthesia Evaluation     Patient summary reviewed and Nursing notes reviewed    History of anesthetic  complications   Airway   Mallampati: I  TM distance: >3 FB  Neck ROM: full  Dental      Pulmonary - normal exam   Cardiovascular - normal exam  (+) hypertension    Neuro/Psych - negative ROS     GI/Hepatic/Renal - negative ROS     Endo/Other    Abdominal                  Anesthesia Plan:   ASA:  2  Plan:   General and regional  Informed Consent Plan and Risks Discussed With:  Patient      I have informed Mary Pisano and/or legal guardian or family member of the nature of the anesthetic plan, benefits, risks including possible dental damage if relevant, major complications, and any alternative forms of anesthetic management.   All of the patient's questions were answered to the best of my ability. The patient desires the anesthetic management as planned.  Michael Arellano MD  2/5/2025 6:53 AM  Present on Admission:  **None**           [1]   Medications Prior to Admission   Medication Sig Dispense Refill Last Dose/Taking    estradiol 0.1 MG/GM Vaginal Cream Place 1 g vaginally daily.   2/4/2025 at  9:00 PM    vitamin B-12 50 MCG Oral Tab Take 1 tablet (50 mcg total) by mouth daily.   1/29/2025    alendronate 35 MG Oral Tab Take 1 tablet (35 mg total) by mouth every 7 days.   2/2/2025    levothyroxine 88 MCG Oral Tab Take 1 tablet (88 mcg total) by mouth before breakfast.   2/5/2025 at  4:45 AM    Calcium Carbonate Antacid 1000 MG Oral Chew Tab Chew 1,000 mg by mouth 3 (three) times daily.   1/29/2025    Cholecalciferol (VITAMIN D) 2000 units Oral Cap Take by mouth.   1/29/2025   [2]   Current Facility-Administered Medications Ordered in Epic   Medication Dose Route Frequency Provider Last Rate Last Admin    lactated ringers infusion   Intravenous Continuous Ramesh Baron MD 20 mL/hr at 02/05/25 0609 New Bag at 02/05/25 0609    ceFAZolin (Ancef) 2g in 10mL IV syringe premix  2 g Intravenous Once Marilu Haynes PA         No current [facility]-ordered outpatient medications on file.   [3]   Allergies  Allergen  Reactions    Lisinopril Coughing

## 2025-02-05 NOTE — DISCHARGE INSTRUCTIONS
Dr Baron Arthroscopy Discharge Instructions      Dressing:  Your dressing should remain intact after surgery.  If the dressing becomes soiled or damp, you may remove the dressing and replace the bandage.  Please do not remove steri-strips that are covering your incisions (small pieces of tape).  Removing this tape may cause your incision to separate.  Please be certain to wash hands thoroughly prior to changing dressing, do not place any ointments over incisions.    Bathing:  Please do not get incisions wet.  Please cover dressing or incisions while bathing.  The dressing will be changed at your post-op visit.    Driving:  Do not drive while wearing a sling or while taking narcotic pain medications.  You are considered an impaired  following surgery, and if you choose to drive, your insurance may not cover any accidents that may occur.    Cold Therapy/Ice:  You will be sent home with a porable ice unit.  Please use this ice unit during the first week after your surgery.  Continuous icing will help to decrease swelling and provide pain relief.  We recommend icing for 2 hours and then take a 2 hour break.  It is very important to always have protection between the ice pad and your skin.  Never place the ice pad directly on your skin; this could lead to an injury to your skin.  If the ice machine causes increased pain, skin rash or irritation, discontinue its use and call the office.    Sling:  Wear sling for    Physical Therapy:  Following surgery you can begin hand, wrist and elbow range of motion exercises.  You will begin regular outpatient physical therapy in     Medications:    Norco 10/325 mg (Short acting pain medication)  Take 1-2 tablets every 6 hours for pain.      Oxycontin 10 mg (Long acting pain medication)  Take 1 tablet every 12 hours for pain.      Zofran 4 mg   Take 1 tablet every 8 hours for nausea.      Antibiotic for 3 days  Take 1 tablet every 6 hours.      Etodolac 400 mg  (Anti-inflammatory)  Take 1 tablet every 12 hours.    - Do not resume home oral anti-inflammatory medications without MD approval.    - We recommend using over the counter stool softeners (Colace 2X per day) while on narcotic pain medications to help prevent constipation.  You can find these at your local pharmacy.      - We ask during the first week following surgery that you take deep breaths and cough to help prevent a respiratory infection.  Take 10 deep breaths every hour followed by a cough.    - *As of October 6, 2014 prescribers are not allowed to issue refills on new prescriptions for products containing Hydrocodone (Norco).  These prescriptions cannot be sent into the pharmacy from our office by fax or phone due to the new [facility] Federal Regulations.  Please discuss your medication refills with our staff at your post-operative follow up appointment.  If a refill is needed, you will need to  a written prescription from our office.     Signs and Symptoms of Complications:  Although and infection is rare, it can be very serious if it goes untreated.  Please call our office you experience increased pain not relieved with medication, high fever, chills, redness, swelling or drainage from incision.    For further questions, or if any problems develop, please call the office at [phone] and ask for Dr Baron's nurse Danika.  After hours, or on weekends, please call the orthopaedic resident on call at [phone].    Post Op Appointment:  [facility] [address] [place] [address]    [address] [address] [place] [address]    HOME INSTRUCTIONS  [facility] HOME CARE INSTRUCTIONS: POST-OP ANESTHESIA  The medication that you received for sedation or general anesthesia can last up to 24 hours. Your judgment and reflexes may be altered, even if you feel like your normal self.      We Recommend:   Do not drive any motor vehicle or bicycle   Avoid mowing the lawn, playing sports, or working with power  tools/applicances (power saws, electric knives or mixers)   That you have someone stay with you on your first night home   Do not drink alcohol or take sleeping pills or tranquilizers   Do not sign legal documents within 24 hours of your procedure   If you had a nerve block for your surgery, take extra care not to put any pressure on your arm or hand for 24 hours    It is normal:  For you to have a sore throat if you had a breathing tube during surgery (while you were asleep!). The sore throat should get better within 48 hours. You can gargle with warm salt water (1/2 tsp in 4 oz warm water) or use a throat lozenge for comfort  To feel muscle aches or soreness especially in the abdomen, chest or neck. The achy feeling should go away in the next 24 hours  To feel weak, sleepy or \"wiped out\". Your should start feeling better in the next 24 hours.   To experience mild discomforts such as sore lip or tongue, headache, cramps, gas pains or a bloated feeling in your abdomen.   To experience mild back pain or soreness for a day or two if you had spinal or epidural anesthesia.   If you had laparoscopic surgery, to feel shoulder pain or discomfort on the day of surgery.   For some patients to have nausea after surgery/anesthesia    If you feel nausea or experience vomiting:   Try to move around less.   Eat less than usual or drink only liquids until the next morning   Nausea should resolve in about 24 hours    If you have a problem when you are at home:    Call your surgeons office     Discharge Instructions: After Your Surgery  You’ve just had surgery. During surgery, you were given medicine called anesthesia to keep you relaxed and free of pain. After surgery, you may have some pain or nausea. This is common. Here are some tips for feeling better and getting well after surgery.   Going home  Your healthcare provider will show you how to take care of yourself when you go home. They'll also answer your questions. Have an  adult family member or friend drive you home. For the first 24 hours after your surgery:   Don't drive or use heavy equipment.  Don't make important decisions or sign legal papers.  Take medicines as directed.  Don't drink alcohol.  Have someone stay with you, if needed. They can watch for problems and help keep you safe.  Be sure to go to all follow-up visits with your healthcare provider. And rest after your surgery for as long as your provider tells you to.   Coping with pain  If you have pain after surgery, pain medicine will help you feel better. Take it as directed, before pain becomes severe. Also, ask your healthcare provider or pharmacist about other ways to control pain. This might be with heat, ice, or relaxation. And follow any other instructions your surgeon or nurse gives you.      Stay on schedule with your medicine.     Tips for taking pain medicine  To get the best relief possible, remember these points:   Pain medicines can upset your stomach. Taking them with a little food may help.  Most pain relievers taken by mouth need at least 20 to 30 minutes to start to work.  Don't wait till your pain becomes severe before you take your medicine. Try to time your medicine so that you can take it before starting an activity. This might be before you get dressed, go for a walk, or sit down for dinner.  Constipation is a common side effect of some pain medicines. Call your healthcare provider before taking any medicines such as laxatives or stool softeners to help ease constipation. Also ask if you should skip any foods. Drinking lots of fluids and eating foods such as fruits and vegetables that are high in fiber can also help. Remember, don't take laxatives unless your surgeon has prescribed them.  Drinking alcohol and taking pain medicine can cause dizziness and slow your breathing. It can even be deadly. Don't drink alcohol while taking pain medicine.  Pain medicine can make you react more slowly to  things. Don't drive or run machinery while taking pain medicine.  Your healthcare provider may tell you to take acetaminophen to help ease your pain. Ask them how much you're supposed to take each day. Acetaminophen or other pain relievers may interact with your prescription medicines or other over-the-counter (OTC) medicines. Some prescription medicines have acetaminophen and other ingredients in them. Using both prescription and OTC acetaminophen for pain can cause you to accidentally overdose. Read the labels on your OTC medicines with care. This will help you to clearly know the list of ingredients, how much to take, and any warnings. It may also help you not take too much acetaminophen. If you have questions or don't understand the information, ask your pharmacist or healthcare provider to explain it to you before you take the OTC medicine.   Managing nausea  Some people have an upset stomach (nausea) after surgery. This is often because of anesthesia, pain, or pain medicine, less movement of food in the stomach, or the stress of surgery. These tips will help you handle nausea and eat healthy foods as you get better. If you were on a special food plan before surgery, ask your healthcare provider if you should follow it while you get better. Check with your provider on how your eating should progress. It may depend on the surgery you had. These general tips may help:   Don't push yourself to eat. Your body will tell you when to eat and how much.  Start off with clear liquids and soup. They're easier to digest.  Next try semi-solid foods as you feel ready. These include mashed potatoes, applesauce, and gelatin.  Slowly move to solid foods. Don’t eat fatty, rich, or spicy foods at first.  Don't force yourself to have 3 large meals a day. Instead eat smaller amounts more often.  Take pain medicines with a small amount of solid food, such as crackers or toast. This helps prevent nausea.  When to call your healthcare  provider  Call your healthcare provider right away if you have any of these:   You still have too much pain, or the pain gets worse, after taking the medicine. The medicine may not be strong enough. Or there may be a complication from the surgery.  You feel too sleepy, dizzy, or groggy. The medicine may be too strong.  Side effects such as nausea or vomiting. Your healthcare provider may advise taking other medicines to .  Skin changes such as rash, itching, or hives. This may mean you have an allergic reaction. Your provider may advise taking other medicines.  The incision looks different (for instance, part of it opens up).  Bleeding or fluid leaking from the incision site, and weren't told to expect that.  Fever of 100.4°F (38°C) or higher, or as directed by your provider.  Call 911  Call 911 right away if you have:   Trouble breathing  Facial swelling    If you have obstructive sleep apnea   You were given anesthesia medicine during surgery to keep you comfortable and free of pain. After surgery, you may have more apnea spells because of this medicine and other medicines you were given. The spells may last longer than normal.    At home:  Keep using the continuous positive airway pressure (CPAP) device when you sleep. Unless your healthcare provider tells you not to, use it when you sleep, day or night. CPAP is a common device used to treat obstructive sleep apnea.  Talk with your provider before taking any pain medicine, muscle relaxants, or sedatives. Your provider will tell you about the possible dangers of taking these medicines.  Contact your provider if your sleeping changes a lot even when taking medicines as directed.  [employer] last reviewed this educational content on 10/1/2021  © 9469-8010 [employer]. All rights reserved. This information is not intended as a substitute for professional medical care. Always follow your healthcare professional's instructions.

## 2025-02-05 NOTE — INTERVAL H&P NOTE
Pre-op Diagnosis: Rotator cuff tear    The above referenced H&P was reviewed by DISHA DAMON PA on 2/5/2025, the patient was examined and no significant changes have occurred in the patient's condition since the H&P was performed.  I discussed with the patient and/or legal representative the potential benefits, risks and side effects of this procedure; the likelihood of the patient achieving goals; and potential problems that might occur during recuperation.  I discussed reasonable alternatives to the procedure, including risks, benefits and side effects related to the alternatives and risks related to not receiving this procedure.  We will proceed with procedure as planned.

## 2025-02-05 NOTE — ANESTHESIA PROCEDURE NOTES
Airway  Date/Time: 2/5/2025 7:22 AM  Urgency: Elective      General Information and Staff    Patient location during procedure: OR  Anesthesiologist: Michael Arellano MD  Resident/CRNA: Bonny Spivey CRNA  Performed: CRNA   Performed by: Bonny Spivey CRNA  Authorized by: Michael Arellano MD      Indications and Patient Condition  Indications for airway management: anesthesia  Sedation level: deep  Preoxygenated: yes  Patient position: sniffing  Mask difficulty assessment: 1 - vent by mask    Final Airway Details  Final airway type: endotracheal airway      Successful airway: ETT  Cuffed: yes   Successful intubation technique: Video laryngoscopy  Endotracheal tube insertion site: oral  Blade: Marie  Blade size: #3  ETT size (mm): 7.5    Cormack-Lehane Classification: grade I - full view of glottis  Placement verified by: capnometry   Cuff volume (mL): 8  Measured from: lips  ETT to lips (cm): 21  Number of attempts at approach: 1

## 2025-02-06 NOTE — OPERATIVE REPORT
[facility]    PATIENT'S NAME: SHANDA TABOR   ATTENDING PHYSICIAN: Ramesh Baron MD   OPERATING PHYSICIAN: Ramesh Baron MD   PATIENT ACCOUNT#:   [id number]    LOCATION:  [address]  MEDICAL RECORD #:   [id number]       YOB: 1970  ADMISSION DATE:       02/05/2025      OPERATION DATE:  02/05/2025    OPERATIVE REPORT    PREOPERATIVE DIAGNOSIS:  1.   Left rotator cuff tear.  2.   Subacromial impingement.  POSTOPERATIVE DIAGNOSIS:  1.   Left rotator cuff tear.  2.   Adhesive capsulitis.  3.   Impingement.  PROCEDURE:  1.   Left arthroscopic rotator cuff repair.  2.   Left arthroscopic capsular release.  3.   Left arthroscopic decompression.    INDICATIONS:  This is a 54-year-old female with a history of a painful shoulder. I discussed with the patient treatment options including both operative and non-operative management. The risks and benefits of the procedure were review including but not exclusive to bleeding, infection, neurologic injury, medical related complications, persistent pain, post-operative stiffness, recurrent tear and post-operative weakness. After review of these and a discussion of the need for post-operative therapy to optimize function and results informed consent was obtained. The use of an assistant was required for arm positioning and to assist with utilizing the arthroscope for visualization throughout the course of surgery.    OPERATIVE TECHNIQUE:  The patient was brought to the operating room after general anesthetic and interscalene block were induced, the patient was placed in a beach-chair position with all bony prominences padded paying particular attention to placing their head in neutral cervical alignment and padding the contralateral ulna nerve. SCD boots were used for DVT prophylaxis. The patient received preoperative antibiotics and was sterilely prepped and draped.   A time out procedure was performed with the anesthesia and nursing staff to  confirm patient identification, procedure and laterality.     Diagnostic arthroscopy of the glenohumeral joint revealed no articular damage to the glenoid or humerus.  There was noted to be diffuse capsulitis with contracture of the rotator interval.  There was a tear of the upper part of the subscapularis tendon.  Biceps tendon was normal, as was the bicipital pulley complex.  The supraspinatus, infraspinatus, and teres minor were intact, as was the inferior capsule.    I initially performed an arthroscopic capsular release, releasing the rotator interval tissue including superior glenohumeral ligament, coracohumeral ligament, middle glenohumeral ligament, upper border of the inferior glenohumeral ligament, and the posterior super capsule to allow for normal motion.  After debridement, we turned attention toward the upper subscapularis tear.  The subscapularis was debrided as was the lesser tuberosity.  A 3.5 anchor was placed into the lesser tuberosity.  It was brought through the partial subscapularis tear and repaired anatomically.    The under surface of the acromion was inspected and the CA ligament demonstrated an attritional lesion therefore I elected to proceed with an arthroscopic decompression.  The CA ligament was released exposing an anterior acromial spur which was resected with the use of a motorized bur.  This was performed through both the anterolateral portal and the posterior portal.    After a thorough debridement, the instruments removed, portal sites were closed, and the patient was sent to the recovery room in stable condition.    Dictated By Ramesh Baron MD  d: 02/05/2025 17:27:57  t: 02/05/2025 17:30:29  Job [id number]  /

## (undated) DEVICE — COVER,MAYO STAND,STERILE: Brand: MEDLINE

## (undated) DEVICE — SUT FBRWR 2 38IN N ABSRB BLU L26.5MM 1/2

## (undated) DEVICE — 3M™ STERI-STRIP™ REINFORCED ADHESIVE SKIN CLOSURES, R1547, 1/2 IN X 4 IN (12 MM X 100 MM), 6 STRIPS/ENVELOPE: Brand: 3M™ STERI-STRIP™

## (undated) DEVICE — CANNULA ARTHSCP L7CM DIA5.75MM CRYS SMTH

## (undated) DEVICE — 3M™ TEGADERM™ TRANSPARENT FILM DRESSING FRAME STYLE, 1626W, 4 IN X 4-3/4 IN (10 CM X 12 CM), 50/CT 4CT/CASE: Brand: 3M™ TEGADERM™

## (undated) DEVICE — SHOULDER STABILIZATION KIT,                                    DISPOSABLE 12 PER BOX

## (undated) DEVICE — GAMMEX® PI HYBRID SIZE 7, STERILE POWDER-FREE SURGICAL GLOVE, POLYISOPRENE AND NEOPRENE BLEND: Brand: GAMMEX

## (undated) DEVICE — WRAP COOLING SHLDR W/ICE PILLO

## (undated) DEVICE — SOLUTION IV 1000ML 0.9% NACL PRESERVATIVE

## (undated) DEVICE — GAMMEX® NON-LATEX PI ORTHO SIZE 9, STERILE POLYISOPRENE POWDER-FREE SURGICAL GLOVE: Brand: GAMMEX

## (undated) DEVICE — MEDI-VAC NON-CONDUCTIVE SUCTION TUBING: Brand: CARDINAL HEALTH

## (undated) DEVICE — SHOULDER ARTHROSCOPY: Brand: MEDLINE INDUSTRIES, INC.

## (undated) DEVICE — SHOULDER P.A.D II: Brand: DEROYAL

## (undated) DEVICE — BLADE SHV L13CM DIA3.8MM DBL CUT COOLCUT

## (undated) DEVICE — GAMMEX® PI HYBRID SIZE 9, STERILE POWDER-FREE SURGICAL GLOVE, POLYISOPRENE AND NEOPRENE BLEND: Brand: GAMMEX

## (undated) DEVICE — TOWEL,OR,DSP,ST,BLUE,DLX,2/PK,40PK/CS: Brand: MEDLINE

## (undated) DEVICE — BEACH CHAIR MASK SGL USE

## (undated) DEVICE — AMBIENT SUPER TURBOVAC 90: Brand: COBLATION

## (undated) DEVICE — SUT MCRYL 4-0 18IN PC-3 ABSRB UD L16MM 1/2 CI

## (undated) DEVICE — SPONGE 4X4 10PK

## (undated) DEVICE — BLADE SHV 5.5MMX13CM BNE CUT COOLCUT

## (undated) DEVICE — SOLUTION IRRIG 3000ML 0.9% NACL FLX CONT

## (undated) DEVICE — TUBING PMP L16FT DISP INFLOW

## (undated) DEVICE — APPLICATOR PREP 26ML CHG 2% ISO ALC 70%

## (undated) NOTE — LETTER
[facility], [place]  Authorization for Invasive Procedures  1.  I hereby authorize Dr. Mickey Hall , my physician and whomever may be designated as the doctor's assistant, to perform the following operation and/or procedure:  [place] Huggins performed for the purposes of advancing medicine, science, and/or education, provided my identity is not revealed. If the procedure has been videotaped, the physician/surgeon will obtain the original videotape.  The hospital will not be responsible for stor My signature below affirms that prior to the time of the procedure, I have explained to the patient and/or her legal representative, the risks and benefits involved in the proposed treatment and any reasonable alternative to the proposed treatment.  I have

## (undated) NOTE — MR AVS SNAPSHOT
[address], [address]  [address] [id number]               Thank you for choosing us for your health care visit with Britt Mariano MD.  We are glad to serve you and happy to provide you with this summ between 7:30am to 6pm and on Saturday between 8am and 1pm. Evening and weekend appointments for your exam are available. [facility] ([address]) [address]  [facility] BERNARD kg/m2)   DASH eating plan Adopt a diet rich in fruits, vegetables, and low fat dairy products with reduced content of saturated and total fat.    Dietary sodium reduction Reduce dietary sodium intake to <= 100 mmol per day (2.4 g sodium or 6 g sodium chlori

## (undated) NOTE — LETTER
6/19/2018              [address] [address]        [address] [address]         Dear [facility],    It was a pleasure to see you. Your pap was normal.  There is no need for further testing at this time.   I look forward to seeing you at your n